# Patient Record
Sex: MALE | Race: WHITE | NOT HISPANIC OR LATINO | Employment: FULL TIME | URBAN - METROPOLITAN AREA
[De-identification: names, ages, dates, MRNs, and addresses within clinical notes are randomized per-mention and may not be internally consistent; named-entity substitution may affect disease eponyms.]

---

## 2017-01-05 RX ORDER — CABERGOLINE 0.5 MG/1
TABLET ORAL
Qty: 50 TAB | Refills: 3 | Status: SHIPPED | OUTPATIENT
Start: 2017-01-05 | End: 2018-03-12 | Stop reason: SDUPTHER

## 2017-02-27 DIAGNOSIS — E23.0 HYPOPITUITARISM (HCC): ICD-10-CM

## 2017-02-27 RX ORDER — TESTOSTERONE CYPIONATE 200 MG/ML
100 INJECTION, SOLUTION INTRAMUSCULAR
Qty: 3 ML | Refills: 0 | Status: SHIPPED
Start: 2017-02-27 | End: 2017-06-02 | Stop reason: SDUPTHER

## 2017-04-24 ENCOUNTER — OFFICE VISIT (OUTPATIENT)
Dept: ENDOCRINOLOGY | Facility: MEDICAL CENTER | Age: 57
End: 2017-04-24
Payer: COMMERCIAL

## 2017-04-24 DIAGNOSIS — E23.0 HYPOPITUITARISM (HCC): ICD-10-CM

## 2017-04-24 DIAGNOSIS — D35.2 PITUITARY MACROADENOMA (HCC): Primary | ICD-10-CM

## 2017-04-24 DIAGNOSIS — E22.1 HYPERPROLACTINEMIA (HCC): ICD-10-CM

## 2017-04-24 PROCEDURE — 99214 OFFICE O/P EST MOD 30 MIN: CPT | Performed by: INTERNAL MEDICINE

## 2017-04-24 NOTE — MR AVS SNAPSHOT
Nnamdi Millan   2017 2:40 PM   Office Visit   MRN: [id number]    Department:  Endocrinology [facility]   Dept Phone:  [phone]    Description:  Male : 1960   Provider:  Dario Caballero M.D.           Allergies as of 2017     No Known Allergies      You were diagnosed with     Pituitary macroadenoma (CMS-HCC)   [600844]  -  Primary     Hyperprolactinemia (CMS-[facility])   [462942]       Hypopituitarism (CMS-[facility])   [277963]         Basic Information     Date Of Birth Sex Race Ethnicity Preferred Language    1960 Male White Non- English      Your appointments     Oct 23, 2017 10:00 AM   Established Patient with Dario Caballero M.D.   [facility]    [id number] [place] [address], [address]  [facility] [address] [phone]           You will be receiving a confirmation call a few days before your appointment from our automated call confirmation system.              Problem List              ICD-10-CM Priority Class Noted - Resolved    Pituitary macroadenoma (CMS-[facility]) D35.2   2009 - Present    Hypopituitarism (CMS-[facility]) E23.0   2009 - Present    Rectal lump K62.9   2009 - Present    Hyperprolactinemia (CMS-[facility]) E22.1   Unknown - Present    GERD with esophagitis K21.0   2016 - Present      Health Maintenance        Date Due Completion Dates    IMM DTaP/Tdap/Td Vaccine (1 - Tdap) 1979 ---    COLONOSCOPY 2010 ---            Current Immunizations     No immunizations on file.      Below and/or attached are the medications your provider expects you to take. Review all of your home medications and newly ordered medications with your provider and/or pharmacist. Follow medication instructions as directed by your provider and/or pharmacist. Please keep your medication list with you and share with your provider. Update the information when medications are discontinued, doses are changed, or new medications (including  "over-the-counter products) are added; and carry medication information at all times in the event of emergency situations     Allergies:  No Known Allergies          Medications  Valid as of: April 24, 2017 -  3:21 PM    Generic Name Brand Name Tablet Size Instructions for use    Cabergoline (Tab) DOSTINEX 0.5 MG Take 0.25 mg by mouth every Monday and Friday. Indications: Abnormally Increased Levels of Prolactin in the Blood        Cabergoline (Tab) DOSTINEX 0.5 MG TAKE 1 AND 1/2 TABLETS 2   TIMES A WEEK FOR ABNORMALLYINCREASED LEVELS OF        PROLACTIN IN THE BLOOD        Famotidine (Tab) PEPCID 20 MG take 1 tablet by mouth twice a day        Famotidine (Tab) PEPCID 20 MG Take 1 Tab by mouth 2 times a day.        Hydrocortisone (Tab) CORTEF 10 MG TAKE 2 TABLETS (20MG) EVERYMORNING AND TAKE 1 TABLET  (10MG) EVERY EVENING        Levothyroxine Sodium (Tab) SYNTHROID 100 MCG TAKE 1 TABLET BY MOUTH ALTERNATING WITH 1 AND 1/2 TABLETS EVERY OTHER DAY        Needle (Disp) (Misc) NEEDLE (DISP) 23 G 23G X 1\" Use to administer testosterone every 14 days        Needle (Disp) (Misc) NEEDLE (DISP) 18 G 18G X 1\" Use to draw testosterone        Syringe (Disposable) (Misc) Syringe 3 ML Use on every 14 days        Testosterone Cypionate (Solution) DEPO-TESTOSTERONE 200 MG/ML 0.5 mL by Intramuscular route every 14 days.        Testosterone Cypionate (Solution) DEPO-TESTOSTERONE 200 MG/ML 0.5 mL by Intramuscular route every 14 days.        .                 Medicines prescribed today were sent to:     [facility] - [place], [place] - [address] AT PORTAL TO REGISTERED [facility]    [address] [facility] [address]    Phone: [phone] Fax: [phone]    Open 24 Hours?: No    [facility] [place] [address] - [address] [place] [address]    Phone: [phone] Fax: [phone]    Open 24 Hours?: No      Medication refill instructions:       If your " prescription bottle indicates you have medication refills left, it is not necessary to call your provider’s office. Please contact your pharmacy and they will refill your medication.    If your prescription bottle indicates you do not have any refills left, you may request refills at any time through one of the following ways: The online nooked system (except Urgent Care), by calling your provider’s office, or by asking your pharmacy to contact your provider’s office with a refill request. Medication refills are processed only during regular business hours and may not be available until the next business day. Your provider may request additional information or to have a follow-up visit with you prior to refilling your medication.   *Please Note: Medication refills are assigned a new Rx number when refilled electronically. Your pharmacy may indicate that no refills were authorized even though a new prescription for the same medication is available at the pharmacy. Please request the medicine by name with the pharmacy before contacting your provider for a refill.        Your To Do List     Future Labs/Procedures Complete By Expires    CBC WITH DIFFERENTIAL  As directed 10/25/2017    COMP METABOLIC PANEL  As directed 10/25/2017    IGF-1 SOMATOMEDIN  As directed 4/25/2018    MR-BRAIN PITUITARY W/WO  As directed 4/24/2018    PROLACTIN  As directed 10/25/2017    TESTOSTERONE,FREE ADULT MALE  As directed 4/24/2018      Other Notes About Your Plan     [facility] [phone]           nooked Access Code: [email or web address]  Expires: 5/24/2017  3:20 PM    nooked  A secure, online tool to manage your health information     [employer]’s nooked® is a secure, online tool that connects you to your personalized health information from the privacy of your home -- day or night - making it very easy for you to manage your healthcare. Once the activation process is completed, you can even access your medical  information using the [employer] alis, which is available for free in the Apple Alis store or Google Play store.     [employer] provides the following levels of access (as shown below):   My Chart Features   [facility] Doctor [facility]  Specialists [facility]  Urgent  Care Non-[facility]  Primary Care  Doctor   Email your healthcare team securely and privately 24/7 X X X    Manage appointments: schedule your next appointment; view details of past/upcoming appointments X      Request prescription refills. X      View recent personal medical records, including lab and immunizations X X X X   View health record, including health history, allergies, medications X X X X   Read reports about your outpatient visits, procedures, consult and ER notes X X X X   See your discharge summary, which is a recap of your hospital and/or ER visit that includes your diagnosis, lab results, and care plan. X X       How to register for [employer]:  1. Go to  [email or web address].  2. Click on the Sign Up Now box, which takes you to the New Member Sign Up page. You will need to provide the following information:  a. Enter your [employer] Access Code exactly as it appears at the top of this page. (You will not need to use this code after you’ve completed the sign-up process. If you do not sign up before the expiration date, you must request a new code.)   b. Enter your date of birth.   c. Enter your home email address.   d. Click Submit, and follow the next screen’s instructions.  3. Create a [employer] ID. This will be your [employer] login ID and cannot be changed, so think of one that is secure and easy to remember.  4. Create a [employer] password. You can change your password at any time.  5. Enter your Password Reset Question and Answer. This can be used at a later time if you forget your password.   6. Enter your e-mail address. This allows you to receive e-mail notifications when new information is available in [employer].  7. Click Sign Up. You can now  view your health information.    For assistance activating your [employer] account, call [phone]

## 2017-04-25 NOTE — PROGRESS NOTES
Chief Complaint   Patient presents with   • Pituitary Adenoma     hyperprolactinemia        HPI:      1. Hyperprolactinemia.     The patient has decided not to try cyber knife or any additional surgery for his pituitary adenoma.  He has been intolerant of cabergoline but he is going to try to tolerate it rather than take the risk of cyber knife therapy.  He is concerned about secondary malignancy, whether that is valid or not, I don’t know but that is his concern.  He is already hypopit so that shouldn’t be a worry about cyber knife.  In any event, he is taking cabergoline .75 mg twice a week.  He gets some headache and some nausea but it does go away and he is able to carry on with normal function.  His prolactin in January was 116 and now it is down to 59 so I believe he is controlling at least that aspect.  Whether or not he is controlling tumor growth, we don’t know and he knows that we want to do an MRI fairly soon.      2. Pituitary adenoma.    As per the above taking cabergoline regularly.  Vision is intact, particularly peripheral vision.  Headaches, he is making them tolerable.  We will do an MRI when he can get to it.  I will put the order in.    3. Hypopituitarism.    He is satisfied with intramuscular testosterone 100 mg every two weeks.  I did not do a blood level this time but I did do a PSA which is low at 0.8 and his prostate exam last October was benign.      I will see him again in six months and we will update more complete lab at that time.      ROS:  He has been developing small inflammatory nodules here and there. There is one over each anterior tibial area which might pass for erythema nodosum, but he gets them elsewhere on the body, so I don't think so. I advised a dermatologist.    All other systems reported as negative or unchanged since last exam      Allergies: No Known Allergies    Current medicines including changes today:  Current Outpatient Prescriptions   Medication Sig Dispense  "Refill   • testosterone cypionate (DEPO-TESTOSTERONE) 200 MG/ML Solution injection 0.5 mL by Intramuscular route every 14 days. 3 mL 0   • cabergoline (DOSTINEX) 0.5 MG tablet TAKE 1 AND 1/2 TABLETS 2   TIMES A WEEK FOR ABNORMALLYINCREASED LEVELS OF        PROLACTIN IN THE BLOOD 50 Tab 3   • famotidine (PEPCID) 20 MG Tab Take 1 Tab by mouth 2 times a day. 180 Tab 3   • levothyroxine (SYNTHROID) 100 MCG Tab TAKE 1 TABLET BY MOUTH ALTERNATING WITH 1 AND 1/2 TABLETS EVERY OTHER  Tab 3   • testosterone cypionate (DEPO-TESTOSTERONE) 200 MG/ML Solution injection 0.5 mL by Intramuscular route every 14 days. 1 mL 0   • famotidine (PEPCID) 20 MG Tab take 1 tablet by mouth twice a day 180 Tab 1   • hydrocortisone (CORTEF) 10 MG Tab TAKE 2 TABLETS (20MG) EVERYMORNING AND TAKE 1 TABLET  (10MG) EVERY EVENING 270 Tab 3   • NEEDLE, DISP, 23 G 23G X 1\" Misc Use to administer testosterone every 14 days 10 Each 6   • NEEDLE, DISP, 18 G 18G X 1\" Misc Use to draw testosterone 10 Each 6   • Syringe 3 ML Misc Use on every 14 days 10 Each 6   • cabergoline (DOSTINEX) 0.5 MG tablet Take 0.25 mg by mouth every Monday and Friday. Indications: Abnormally Increased Levels of Prolactin in the Blood       No current facility-administered medications for this visit.        Past Medical History   Diagnosis Date   • Pituitary adenoma (CMS-[facility]) 2005     transsphenoidal hypophysectomy   • Hyperprolactinemia (CMS-[facility])    • Hypopituitarism (CMS-[facility])    • rectal nodule      mucosal cyst   • GERD (gastroesophageal reflux disease)        PHYSICAL EXAM:    Weight 259 pounds   , height 6 feet 1 inch.  Blood pressure 124/72    , pulse is 60   . O2 saturation 93%.    Gen.   appears healthy in no distress    Skin   appropriate for sex and age    HEENT  visual fields full to gross confrontation    Neck   no adenopathy, no palpable thyroid gland    Heart  regular.    Breasts   normal male. No gynecomastia    Extremities  no edema    Neuro  gait and " station normal    Psych  appropriate      ASSESSMENT AND RECOMMENDATIONS    1. Hyperprolactinemia (CMS-HCC)              Improving with cabergoline 0.75 mg twice per week  - PROLACTIN; Future  - MR-BRAIN PITUITARY W/WO; Future    2. Pituitary macroadenoma (CMS-HCC)    - TESTOSTERONE,FREE ADULT MALE; Future  - PROLACTIN; Future  - IGF-1 SOMATOMEDIN; Future  - MR-BRAIN PITUITARY W/WO; Future    3. Hypopituitarism (CMS-HCC)    - CBC WITH DIFFERENTIAL; Future  - COMP METABOLIC PANEL; Future      DISPOSITION: Return in about 6 months (around 10/24/2017).       Dario Caballero M.D.    Copies to: Pcp Pt States None None

## 2017-05-11 DIAGNOSIS — E23.0 HYPOPITUITARISM (HCC): ICD-10-CM

## 2017-05-11 RX ORDER — TESTOSTERONE CYPIONATE 200 MG/ML
100 INJECTION, SOLUTION INTRAMUSCULAR
Qty: 1 ML | Refills: 0 | Status: SHIPPED
Start: 2017-05-11 | End: 2017-08-30 | Stop reason: SDUPTHER

## 2017-06-02 DIAGNOSIS — D35.2 PITUITARY MACROADENOMA (HCC): ICD-10-CM

## 2017-06-02 DIAGNOSIS — E23.0 HYPOPITUITARISM (HCC): ICD-10-CM

## 2017-06-02 RX ORDER — TESTOSTERONE CYPIONATE 200 MG/ML
100 INJECTION, SOLUTION INTRAMUSCULAR
Qty: 3 ML | Refills: 0 | Status: SHIPPED
Start: 2017-06-02 | End: 2017-06-05

## 2017-06-02 RX ORDER — HYDROCORTISONE 10 MG/1
TABLET ORAL
Qty: 270 TAB | Refills: 0 | Status: SHIPPED | OUTPATIENT
Start: 2017-06-02 | End: 2017-08-30 | Stop reason: SDUPTHER

## 2017-06-06 DIAGNOSIS — E23.0 HYPOPITUITARISM (HCC): ICD-10-CM

## 2017-08-30 DIAGNOSIS — D35.2 PITUITARY MACROADENOMA (HCC): ICD-10-CM

## 2017-08-30 DIAGNOSIS — K31.89 HYPERACIDITY: ICD-10-CM

## 2017-08-30 DIAGNOSIS — E23.0 HYPOPITUITARISM (HCC): ICD-10-CM

## 2017-09-04 RX ORDER — FAMOTIDINE 20 MG/1
20 TABLET, FILM COATED ORAL 2 TIMES DAILY
Qty: 180 TAB | Refills: 1 | Status: SHIPPED | OUTPATIENT
Start: 2017-09-04 | End: 2018-06-11 | Stop reason: SDUPTHER

## 2017-09-04 RX ORDER — TESTOSTERONE CYPIONATE 200 MG/ML
100 INJECTION, SOLUTION INTRAMUSCULAR
Qty: 1 ML | Refills: 2 | Status: SHIPPED
Start: 2017-09-04 | End: 2017-12-04 | Stop reason: SDUPTHER

## 2017-09-04 RX ORDER — HYDROCORTISONE 10 MG/1
TABLET ORAL
Qty: 270 TAB | Refills: 0 | Status: SHIPPED | OUTPATIENT
Start: 2017-09-04 | End: 2017-12-04 | Stop reason: SDUPTHER

## 2017-10-23 ENCOUNTER — OFFICE VISIT (OUTPATIENT)
Dept: ENDOCRINOLOGY | Facility: MEDICAL CENTER | Age: 57
End: 2017-10-23
Payer: COMMERCIAL

## 2017-10-23 VITALS
HEIGHT: 73 IN | BODY MASS INDEX: 33.66 KG/M2 | HEART RATE: 62 BPM | DIASTOLIC BLOOD PRESSURE: 84 MMHG | OXYGEN SATURATION: 96 % | SYSTOLIC BLOOD PRESSURE: 138 MMHG | WEIGHT: 254 LBS

## 2017-10-23 DIAGNOSIS — E23.0 HYPOPITUITARISM (HCC): Primary | ICD-10-CM

## 2017-10-23 DIAGNOSIS — E22.1 HYPERPROLACTINEMIA (HCC): ICD-10-CM

## 2017-10-23 DIAGNOSIS — Z12.5 SPECIAL SCREENING FOR MALIGNANT NEOPLASM OF PROSTATE: ICD-10-CM

## 2017-10-23 DIAGNOSIS — D35.2 PITUITARY MACROADENOMA (HCC): ICD-10-CM

## 2017-10-23 PROCEDURE — 99214 OFFICE O/P EST MOD 30 MIN: CPT | Performed by: INTERNAL MEDICINE

## 2017-10-24 NOTE — PROGRESS NOTES
"Chief Complaint   Patient presents with   • Pituitary Adenoma     hyperprolactinemia   • Hypopituitarism        HPI:        1. Hyperprolactinemia.    He is tolerating cabergoline .75 mg twice weekly.  It does make him sick for a day or so but he is willing to do that rather than cyberknife on his pituitary.  He still has headaches but not worse or incapacitating.  No breast swelling or tenderness.  His most recent prolactin done last month was 79 and previous 59 and prior to that 116 so he is still in reasonable range taking all the cabergoline that he can tolerate.    2. Pituitary adenoma.    His last MRI was 2015.  He can’t seem to get away from work on any regular basis enough to schedule an MRI.  Fortunately his visual fields remain intact.  We are assuming his pituitary adenoma is not growing but he understands we will need to do another MRI sometime when he can arrange it.    3. Hypopituitarism.    He is satisfied with his pituitary replacement.  He is taking testosterone 100 mg every two weeks.  His recent testosterone level which I think was done before the next shot was low at 0.6 (7.2-24).  Nonetheless, he is satisfied with his current level of function.  Hydrocortisone 20 mg AM and 10 mg PM and taking levothyroxine 100 mcg per day.  I did not get a T4 level this time.  Also I did not get a PSA level which we will do next time around.  I did check his prostate today and it is benign.      ROS:  All other systems reported as negative or unchanged since last exam      Allergies: No Known Allergies    Current medicines including changes today:  Current Outpatient Prescriptions   Medication Sig Dispense Refill   • hydrocortisone (CORTEF) 10 MG Tab TAKE 2 TABLETS BY MOUTH IN THE MORNING AND 1 TABLET IN THE EVENING. 270 Tab 0   • testosterone cypionate (DEPO-TESTOSTERONE) 200 MG/ML Solution injection 0.5 mL by Intramuscular route every 14 days. 1 mL 2   • NEEDLE, DISP, 23 G 23G X 1\" Misc Use to administer " "testosterone every 14 days 10 Each 6   • Syringe 3 ML Misc Use on every 14 days 10 Each 6   • cabergoline (DOSTINEX) 0.5 MG tablet TAKE 1 AND 1/2 TABLETS 2   TIMES A WEEK FOR ABNORMALLYINCREASED LEVELS OF        PROLACTIN IN THE BLOOD 50 Tab 3   • famotidine (PEPCID) 20 MG Tab Take 1 Tab by mouth 2 times a day. 180 Tab 3   • levothyroxine (SYNTHROID) 100 MCG Tab TAKE 1 TABLET BY MOUTH ALTERNATING WITH 1 AND 1/2 TABLETS EVERY OTHER  Tab 3   • famotidine (PEPCID) 20 MG Tab Take 1 Tab by mouth 2 times a day. TAKE 1 TABLET BY MOUTH TWICE A  Tab 1   • NEEDLE, DISP, 18 G 18G X 1\" Misc Use to draw testosterone 10 Each 6   • cabergoline (DOSTINEX) 0.5 MG tablet Take 0.25 mg by mouth every Monday and Friday. Indications: Abnormally Increased Levels of Prolactin in the Blood       No current facility-administered medications for this visit.         Past Medical History:   Diagnosis Date   • GERD (gastroesophageal reflux disease)    • Hyperprolactinemia (CMS-HCC)    • Hypopituitarism (CMS-HCC)    • Pituitary adenoma (CMS-HCC) 2005    transsphenoidal hypophysectomy   • rectal nodule     mucosal cyst       PHYSICAL EXAM:    /84   Pulse 62   Ht 1.854 m (6' 1\")   Wt 115.2 kg (254 lb)   SpO2 96%   BMI 33.51 kg/m²      Gen.   appears healthy     Skin   appropriate for sex and age    HEENT  visual fields are full to gross confrontation    Neck   No abnormalities in the thyroid area. No nodules or masses elsewhere in the neck or supraclavicular area.    Heart  regular    Breasts   normal male, no gynecomastia    Abdomen   soft nontender without masses    Rectal exam   prostate is mildly enlarged about 1+. Well-circumscribed and smooth. No induration or nodules. Benign    Extremities  no edema    Neuro  gait and station normal    Psych  appropriate    ASSESSMENT AND RECOMMENDATIONS    1. Hyperprolactinemia (CMS-HCC)               Limited tolerance for cabergoline.  - PROLACTIN; Future    2. Pituitary " macroadenoma (CMS-HCC)            Question of residual. Last MRI 2015  - COMP METABOLIC PANEL; Future  - CBC WITHOUT DIFFERENTIAL; Future  - TESTOSTERONE,FREE ADULT MALE; Future  - ESTRADIOL; Future  - FREE THYROXINE; Future    3. Hypopituitarism (CMS-HCC)              - COMP METABOLIC PANEL; Future  - CBC WITHOUT DIFFERENTIAL; Future  - TESTOSTERONE,FREE ADULT MALE; Future  - ESTRADIOL; Future  - FREE THYROXINE; Future    4. Special screening for malignant neoplasm of prostate    - PROSTATE SPECIFIC AG SCREENING; Future      DISPOSITION:  Follow up 6 months       Dario Caballero M.D.    Copies to: Pcp Pt States None

## 2017-12-04 DIAGNOSIS — E23.0 HYPOPITUITARISM (HCC): ICD-10-CM

## 2017-12-04 DIAGNOSIS — D35.2 PITUITARY MACROADENOMA (HCC): ICD-10-CM

## 2017-12-05 RX ORDER — LEVOTHYROXINE SODIUM 0.1 MG/1
TABLET ORAL
Qty: 180 TAB | Refills: 3 | Status: SHIPPED | OUTPATIENT
Start: 2017-12-05 | End: 2018-11-16 | Stop reason: SDUPTHER

## 2017-12-05 RX ORDER — HYDROCORTISONE 10 MG/1
TABLET ORAL
Qty: 270 TAB | Refills: 1 | Status: SHIPPED | OUTPATIENT
Start: 2017-12-05 | End: 2018-06-11 | Stop reason: SDUPTHER

## 2018-01-10 ENCOUNTER — TELEPHONE (OUTPATIENT)
Dept: ENDOCRINOLOGY | Facility: MEDICAL CENTER | Age: 58
End: 2018-01-10

## 2018-01-10 NOTE — TELEPHONE ENCOUNTER
"Phone call with Anupama Solitario regarding the PSA lab that was conducted for Nnamdi Millan. They need clarification in order for him to not be billed. As of right now, it was entered as screening for malignant neoplasm. Current guidelines do not allow \"screening\" as a viable option for testing the PSA. They are asking for a letter of medical necessity as to why the PSA was done, in order for them to get that lab covered for Nnamdi.  "

## 2018-01-16 ENCOUNTER — TELEPHONE (OUTPATIENT)
Dept: ENDOCRINOLOGY | Facility: MEDICAL CENTER | Age: 58
End: 2018-01-16

## 2018-01-16 NOTE — TELEPHONE ENCOUNTER
"Phone conversation with Anupama over the necessity of the PSA lab. She says that as it stands right now, the PSA lab will not be covered because it is considered a \"screening for cancer\". She is requesting a letter of medical necessity as to why PSA needs to be monitored in Nnamdi Millan's case.     Telephone conversation with Anupama Solitario. Patient is getting testosterone and has prostate enlargement. The PSA is a diagnostic test to rule out prostate cancer which of course would be contraindicated in terms of using testosterone. She will get it covered.    Dario Caballero M.D.  "

## 2018-03-12 RX ORDER — CABERGOLINE 0.5 MG/1
TABLET ORAL
Qty: 50 TAB | Refills: 3 | Status: SHIPPED | OUTPATIENT
Start: 2018-03-12 | End: 2018-06-13 | Stop reason: SDUPTHER

## 2018-04-23 ENCOUNTER — APPOINTMENT (OUTPATIENT)
Dept: ENDOCRINOLOGY | Facility: MEDICAL CENTER | Age: 58
End: 2018-04-23
Payer: COMMERCIAL

## 2018-06-11 DIAGNOSIS — K31.89 HYPERACIDITY: ICD-10-CM

## 2018-06-11 DIAGNOSIS — D35.2 PITUITARY MACROADENOMA (HCC): ICD-10-CM

## 2018-06-12 RX ORDER — HYDROCORTISONE 10 MG/1
TABLET ORAL
Qty: 270 TAB | Refills: 1 | Status: SHIPPED | OUTPATIENT
Start: 2018-06-12 | End: 2018-08-29 | Stop reason: SDUPTHER

## 2018-06-12 RX ORDER — FAMOTIDINE 20 MG/1
TABLET, FILM COATED ORAL
Qty: 180 TAB | Refills: 1 | Status: SHIPPED | OUTPATIENT
Start: 2018-06-12 | End: 2023-02-22 | Stop reason: SDUPTHER

## 2018-06-13 DIAGNOSIS — E23.0 HYPOPITUITARISM (HCC): ICD-10-CM

## 2018-06-13 RX ORDER — CABERGOLINE 0.5 MG/1
TABLET ORAL
Qty: 50 TAB | Refills: 3 | Status: SHIPPED | OUTPATIENT
Start: 2018-06-13 | End: 2019-02-20 | Stop reason: SDUPTHER

## 2018-06-13 RX ORDER — TESTOSTERONE CYPIONATE 200 MG/ML
INJECTION, SOLUTION INTRAMUSCULAR
Qty: 3 ML | Refills: 2 | Status: SHIPPED | OUTPATIENT
Start: 2018-06-13 | End: 2018-11-28 | Stop reason: SDUPTHER

## 2018-06-25 ENCOUNTER — OFFICE VISIT (OUTPATIENT)
Dept: ENDOCRINOLOGY | Facility: MEDICAL CENTER | Age: 58
End: 2018-06-25
Payer: COMMERCIAL

## 2018-06-25 ENCOUNTER — HOSPITAL ENCOUNTER (OUTPATIENT)
Dept: LAB | Facility: MEDICAL CENTER | Age: 58
End: 2018-06-25
Attending: INTERNAL MEDICINE
Payer: COMMERCIAL

## 2018-06-25 VITALS
DIASTOLIC BLOOD PRESSURE: 90 MMHG | BODY MASS INDEX: 33.8 KG/M2 | OXYGEN SATURATION: 95 % | WEIGHT: 255 LBS | HEART RATE: 67 BPM | SYSTOLIC BLOOD PRESSURE: 148 MMHG | HEIGHT: 73 IN

## 2018-06-25 DIAGNOSIS — E22.1 HYPERPROLACTINEMIA (HCC): Primary | ICD-10-CM

## 2018-06-25 DIAGNOSIS — D35.2 PITUITARY MACROADENOMA (HCC): ICD-10-CM

## 2018-06-25 DIAGNOSIS — E23.0 HYPOPITUITARISM (HCC): ICD-10-CM

## 2018-06-25 DIAGNOSIS — E22.1 HYPERPROLACTINEMIA (HCC): ICD-10-CM

## 2018-06-25 LAB
ANION GAP SERPL CALC-SCNC: 8 MMOL/L (ref 0–11.9)
BUN SERPL-MCNC: 13 MG/DL (ref 8–22)
CALCIUM SERPL-MCNC: 9.5 MG/DL (ref 8.5–10.5)
CHLORIDE SERPL-SCNC: 109 MMOL/L (ref 96–112)
CO2 SERPL-SCNC: 23 MMOL/L (ref 20–33)
CREAT SERPL-MCNC: 0.87 MG/DL (ref 0.5–1.4)
ESTRADIOL SERPL-MCNC: <20 PG/ML
GLUCOSE SERPL-MCNC: 103 MG/DL (ref 65–99)
POTASSIUM SERPL-SCNC: 4.1 MMOL/L (ref 3.6–5.5)
PROLACTIN SERPL-MCNC: 57.97 NG/ML (ref 2.1–17.7)
SODIUM SERPL-SCNC: 140 MMOL/L (ref 135–145)
T4 FREE SERPL-MCNC: 0.82 NG/DL (ref 0.53–1.43)
TSH SERPL DL<=0.005 MIU/L-ACNC: 0.27 UIU/ML (ref 0.38–5.33)

## 2018-06-25 PROCEDURE — 84402 ASSAY OF FREE TESTOSTERONE: CPT

## 2018-06-25 PROCEDURE — 99214 OFFICE O/P EST MOD 30 MIN: CPT | Performed by: INTERNAL MEDICINE

## 2018-06-25 PROCEDURE — 84439 ASSAY OF FREE THYROXINE: CPT

## 2018-06-25 PROCEDURE — 84146 ASSAY OF PROLACTIN: CPT

## 2018-06-25 PROCEDURE — 84305 ASSAY OF SOMATOMEDIN: CPT

## 2018-06-25 PROCEDURE — 84443 ASSAY THYROID STIM HORMONE: CPT

## 2018-06-25 PROCEDURE — 80048 BASIC METABOLIC PNL TOTAL CA: CPT

## 2018-06-25 PROCEDURE — 36415 COLL VENOUS BLD VENIPUNCTURE: CPT

## 2018-06-25 PROCEDURE — 82670 ASSAY OF TOTAL ESTRADIOL: CPT

## 2018-06-26 LAB
IGF-I SERPL-MCNC: 162 NG/ML (ref 58–204)
IGF-I Z-SCORE SERPL: 1
TESTOST FREE SERPL-MCNC: 32 PG/ML (ref 47–244)

## 2018-06-26 NOTE — PROGRESS NOTES
"Chief Complaint   Patient presents with   • Pituitary Adenoma     hyperprolactinemia   • Hypopituitarism        HPI:    SEE assessment and recommendations below    ROS:  There have been some serious billing problems with the hospital in [facility] so he has been reluctant to have tests done for fear of getting into a another problem    Also has difficulty scheduling time off work to get tests done or to come into renal.    All other systems reported as negative or unchanged since last exam      Allergies: No Known Allergies    Current medicines including changes today:  Current Outpatient Prescriptions   Medication Sig Dispense Refill   • testosterone cypionate (DEPO-TESTOSTERONE) 200 MG/ML Solution injection INJECT 0.5 ML BY INTRAMUSCULAR ROUTE EVERY 14 DAYS. 3 mL 2   • cabergoline (DOSTINEX) 0.5 MG tablet TAKE 1 AND 1/2 TABLETS, 2 TIMES A WEEK, FOR ABNORMALLY INCREASED LEVELS OF PROLACTIN IN THE BLOOD 50 Tab 3   • hydrocortisone (CORTEF) 10 MG Tab TAKE 2 TABLETS BY MOUTH IN THE MORNING AND 1 TABLET IN THE EVENING. 270 Tab 1   • levothyroxine (SYNTHROID) 100 MCG Tab TAKE 1 TABLET BY MOUTH ALTERNATING WITH 1 AND 1/2 TABLETS EVERY OTHER  Tab 3   • NEEDLE, DISP, 18 G 18G X 1\" Misc Use to draw testosterone 10 Each 6   • NEEDLE, DISP, 23 G 23G X 1\" Misc Use to administer testosterone every 14 days 10 Each 6   • Syringe 3 ML Misc Use on every 14 days 10 Each 6   • famotidine (PEPCID) 20 MG Tab Take 1 Tab by mouth 2 times a day. 180 Tab 3   • famotidine (PEPCID) 20 MG Tab Take 1 tablet by mouth twice daily. 180 Tab 1   • cabergoline (DOSTINEX) 0.5 MG tablet Take 0.25 mg by mouth every Monday and Friday. Indications: Abnormally Increased Levels of Prolactin in the Blood       No current facility-administered medications for this visit.         Past Medical History:   Diagnosis Date   • GERD (gastroesophageal reflux disease)    • Hyperprolactinemia (HCC)    • Hypopituitarism (HCC)    • Pituitary adenoma (HCC) 2005    " "transsphenoidal hypophysectomy   • rectal nodule     mucosal cyst       PHYSICAL EXAM:    /90   Pulse 67   Ht 1.854 m (6' 1\")   Wt 115.7 kg (255 lb)   SpO2 95%   BMI 33.64 kg/m²      Gen.   appears healthy in no distress    Skin   appropriate for sex and age    HEENT    visual fields are full to gross confrontation    Neck   no palpable thyroid    Breasts   normal male fatty tissue. No gynecomastia    Heart  regular    Extremities  no edema    Neuro  gait and station normal    Psych  appropriate     Prostate exam done October 2017. PSA done around that time that I do not have the result    ASSESSMENT AND RECOMMENDATIONS    1. Pituitary macroadenoma (HCC), 2005               Prolactinoma partially resected transsphenoidal surgery.               Poor tolerance for cabergoline but able to take 0.75 mg twice weekly now               Last lab September 2017 prolactin = 79.5               Continues to have intermittent chronic headache, nonprogressive. Question relationship to adenoma               Last pituitary MRI 2015 showed small solid/cystic tissue within the sella unchanged compared to 2013    - IGF-1 SOMATOMEDIN; Future  - TESTOSTERONE,FREE ADULT MALE; Future  - ESTRADIOL; Future  - FREE THYROXINE; Future  - TSH; Future  - BASIC METABOLIC PANEL; Future  - CBC WITHOUT DIFFERENTIAL    2. Hyperprolactinemia (HCC)               As above. Can only tolerate cabergoline 0.75 mg twice weekly               Prolactin done last September was 79. Prior to that it was 59 and prior to that it was 116  - PROLACTIN; Future    3. Hypopituitarism (HCC)            Adequately replaced with                    Intramuscular testosterone 100 mg every 2 weeks                    Hydrocortisone 20 mg a.m. and 10 mg p.m. He knows to increase dose with significant stress or illness                    Levothyroxine 100 µg daily             Update lab as per the above      DISPOSITION:  Return in 6 months       Dario Caballero, " M.D.    Copies to: Pcp Pt States None None

## 2018-07-08 ENCOUNTER — TELEPHONE (OUTPATIENT)
Dept: ENDOCRINOLOGY | Facility: MEDICAL CENTER | Age: 58
End: 2018-07-08

## 2018-07-09 NOTE — TELEPHONE ENCOUNTER
Telephone conversation with patient    Current lab results reviewed and mostly satisfactory. Free testosterone is low at 32 taken about midpoint between 2 shots. He is satisfied with this testosterone replacement so we will not make an adjustment. His estradiol level was less than 20. Thyroid levels are in good range with free T4 0.8 and IGF 1 also normal at 162    Prolactin is stable at 57.9. Previously it was 79 and prior to that it was 59.    I indicated we called for the PSA done last fall in the lab could not find a result. He said he would pursue that.    Dario Caballero M.D.

## 2018-07-16 ENCOUNTER — TELEPHONE (OUTPATIENT)
Dept: ENDOCRINOLOGY | Facility: MEDICAL CENTER | Age: 58
End: 2018-07-16

## 2018-08-29 DIAGNOSIS — D35.2 PITUITARY MACROADENOMA (HCC): ICD-10-CM

## 2018-08-29 DIAGNOSIS — K21.00 GERD WITH ESOPHAGITIS: ICD-10-CM

## 2018-08-29 RX ORDER — HYDROCORTISONE 10 MG/1
TABLET ORAL
Qty: 270 TAB | Refills: 1 | Status: SHIPPED | OUTPATIENT
Start: 2018-08-29 | End: 2019-02-20 | Stop reason: SDUPTHER

## 2018-08-29 RX ORDER — FAMOTIDINE 20 MG/1
20 TABLET, FILM COATED ORAL 2 TIMES DAILY
Qty: 180 TAB | Refills: 3 | Status: SHIPPED | OUTPATIENT
Start: 2018-08-29 | End: 2019-10-29 | Stop reason: SDUPTHER

## 2018-11-19 RX ORDER — LEVOTHYROXINE SODIUM 0.1 MG/1
TABLET ORAL
Qty: 180 TAB | Refills: 3 | Status: SHIPPED | OUTPATIENT
Start: 2018-11-19 | End: 2020-01-07 | Stop reason: SDUPTHER

## 2018-11-28 DIAGNOSIS — E23.0 HYPOPITUITARISM (HCC): ICD-10-CM

## 2018-11-28 RX ORDER — TESTOSTERONE CYPIONATE 200 MG/ML
INJECTION, SOLUTION INTRAMUSCULAR
Qty: 3 ML | Refills: 2 | Status: SHIPPED | OUTPATIENT
Start: 2018-11-28 | End: 2019-05-30

## 2018-12-31 ENCOUNTER — APPOINTMENT (OUTPATIENT)
Dept: ENDOCRINOLOGY | Facility: MEDICAL CENTER | Age: 58
End: 2018-12-31
Payer: COMMERCIAL

## 2019-02-20 DIAGNOSIS — D35.2 PITUITARY MACROADENOMA (HCC): ICD-10-CM

## 2019-02-20 RX ORDER — HYDROCORTISONE 10 MG/1
TABLET ORAL
Qty: 270 TAB | Refills: 1 | Status: SHIPPED | OUTPATIENT
Start: 2019-02-20 | End: 2019-10-29 | Stop reason: SDUPTHER

## 2019-02-20 RX ORDER — CABERGOLINE 0.5 MG/1
TABLET ORAL
Qty: 50 TAB | Refills: 3 | Status: SHIPPED | OUTPATIENT
Start: 2019-02-20 | End: 2020-03-18 | Stop reason: SDUPTHER

## 2019-02-20 NOTE — TELEPHONE ENCOUNTER
Was the patient seen in the last year in this department? Yes  LOV 6/25/18 [place]  No future appts    Does patient have an active prescription for medications requested? Yes    Received Request Via: Pharmacy

## 2019-07-01 ENCOUNTER — TELEPHONE (OUTPATIENT)
Dept: ENDOCRINOLOGY | Facility: MEDICAL CENTER | Age: 59
End: 2019-07-01

## 2019-07-01 NOTE — TELEPHONE ENCOUNTER
1. Caller Name: Nnamdi                                         Call Back Number: [phone] (home)         Patient approves a detailed voicemail message: no    Spoke to patient. he made an appointment to come in August 5h. Would like to get labs an MRI done before apt. Please place orders.

## 2019-07-02 DIAGNOSIS — E23.0 HYPOPITUITARISM (HCC): ICD-10-CM

## 2019-07-02 DIAGNOSIS — E22.1 HYPERPROLACTINEMIA (HCC): ICD-10-CM

## 2019-07-02 DIAGNOSIS — D35.2 PITUITARY MACROADENOMA (HCC): Primary | ICD-10-CM

## 2019-07-02 NOTE — TELEPHONE ENCOUNTER
Phone Number Called: [phone] (home)       Call outcome: left message for patient to call back regarding message below    Message: Lm that order was sent for MRI and should be calling to schedule. Gave imagine phone number [phone]

## 2019-07-23 ENCOUNTER — APPOINTMENT (OUTPATIENT)
Dept: RADIOLOGY | Facility: MEDICAL CENTER | Age: 59
End: 2019-07-23
Attending: INTERNAL MEDICINE
Payer: COMMERCIAL

## 2019-07-23 DIAGNOSIS — E23.0 HYPOPITUITARISM (HCC): ICD-10-CM

## 2019-07-23 DIAGNOSIS — E22.1 HYPERPROLACTINEMIA (HCC): ICD-10-CM

## 2019-07-23 DIAGNOSIS — D35.2 PITUITARY MACROADENOMA (HCC): ICD-10-CM

## 2019-07-23 PROCEDURE — 70553 MRI BRAIN STEM W/O & W/DYE: CPT

## 2019-07-23 PROCEDURE — A9585 GADOBUTROL INJECTION: HCPCS | Performed by: INTERNAL MEDICINE

## 2019-07-23 PROCEDURE — 700117 HCHG RX CONTRAST REV CODE 255: Performed by: INTERNAL MEDICINE

## 2019-07-23 RX ORDER — GADOBUTROL 604.72 MG/ML
12 INJECTION INTRAVENOUS ONCE
Status: COMPLETED | OUTPATIENT
Start: 2019-07-23 | End: 2019-07-23

## 2019-07-23 RX ADMIN — GADOBUTROL 12 ML: 604.72 INJECTION INTRAVENOUS at 13:30

## 2019-07-24 ENCOUNTER — TELEPHONE (OUTPATIENT)
Dept: ENDOCRINOLOGY | Facility: MEDICAL CENTER | Age: 59
End: 2019-07-24

## 2019-07-24 NOTE — TELEPHONE ENCOUNTER
Phone Number Called: [phone] (home)       Call outcome: spoke to patient regarding message below    Message: Spoke to patient relaying message. Confirmed aug 5th apt

## 2019-07-24 NOTE — TELEPHONE ENCOUNTER
----- Message from Dario Caballero M.D. sent at 7/23/2019 10:02 PM PDT -----  Tell patient the pituitary MRI looks good and stable.  We will review this at the August 5 appointment.    Dario Caballero M.D.

## 2019-07-25 ENCOUNTER — TELEPHONE (OUTPATIENT)
Dept: ENDOCRINOLOGY | Facility: MEDICAL CENTER | Age: 59
End: 2019-07-25

## 2019-07-25 DIAGNOSIS — E29.1 SECONDARY MALE HYPOGONADISM: ICD-10-CM

## 2019-07-25 RX ORDER — TESTOSTERONE CYPIONATE 200 MG/ML
100 INJECTION, SOLUTION INTRAMUSCULAR
Qty: 3 ML | Refills: 2 | Status: SHIPPED | OUTPATIENT
Start: 2019-07-25 | End: 2019-11-01 | Stop reason: SDUPTHER

## 2019-07-25 NOTE — TELEPHONE ENCOUNTER
Was the patient seen in the last year in this department? Yes    Does patient have an active prescription for medications requested? No     Received Request Via: Pharmacy    Refill testosterone to [facility]

## 2019-08-05 ENCOUNTER — HOSPITAL ENCOUNTER (OUTPATIENT)
Dept: LAB | Facility: MEDICAL CENTER | Age: 59
End: 2019-08-05
Attending: INTERNAL MEDICINE
Payer: COMMERCIAL

## 2019-08-05 ENCOUNTER — OFFICE VISIT (OUTPATIENT)
Dept: ENDOCRINOLOGY | Facility: MEDICAL CENTER | Age: 59
End: 2019-08-05
Payer: COMMERCIAL

## 2019-08-05 VITALS
OXYGEN SATURATION: 98 % | HEART RATE: 57 BPM | SYSTOLIC BLOOD PRESSURE: 136 MMHG | DIASTOLIC BLOOD PRESSURE: 82 MMHG | WEIGHT: 236.2 LBS | BODY MASS INDEX: 31.3 KG/M2 | HEIGHT: 73 IN

## 2019-08-05 DIAGNOSIS — D35.2 PITUITARY MACROADENOMA (HCC): ICD-10-CM

## 2019-08-05 DIAGNOSIS — E22.1 HYPERPROLACTINEMIA (HCC): ICD-10-CM

## 2019-08-05 DIAGNOSIS — E23.0 HYPOPITUITARISM (HCC): ICD-10-CM

## 2019-08-05 DIAGNOSIS — G43.109 MIGRAINE AURA WITHOUT HEADACHE: ICD-10-CM

## 2019-08-05 LAB
ALBUMIN SERPL BCP-MCNC: 4.6 G/DL (ref 3.2–4.9)
ALBUMIN/GLOB SERPL: 1.9 G/DL
ALP SERPL-CCNC: 55 U/L (ref 30–99)
ALT SERPL-CCNC: 15 U/L (ref 2–50)
ANION GAP SERPL CALC-SCNC: 7 MMOL/L (ref 0–11.9)
AST SERPL-CCNC: 19 U/L (ref 12–45)
BASOPHILS # BLD AUTO: 0.5 % (ref 0–1.8)
BASOPHILS # BLD: 0.03 K/UL (ref 0–0.12)
BILIRUB SERPL-MCNC: 0.9 MG/DL (ref 0.1–1.5)
BUN SERPL-MCNC: 19 MG/DL (ref 8–22)
CALCIUM SERPL-MCNC: 9.3 MG/DL (ref 8.5–10.5)
CHLORIDE SERPL-SCNC: 108 MMOL/L (ref 96–112)
CO2 SERPL-SCNC: 24 MMOL/L (ref 20–33)
CREAT SERPL-MCNC: 0.87 MG/DL (ref 0.5–1.4)
EOSINOPHIL # BLD AUTO: 0.18 K/UL (ref 0–0.51)
EOSINOPHIL NFR BLD: 3 % (ref 0–6.9)
ERYTHROCYTE [DISTWIDTH] IN BLOOD BY AUTOMATED COUNT: 40.7 FL (ref 35.9–50)
ESTRADIOL SERPL-MCNC: <20 PG/ML
GLOBULIN SER CALC-MCNC: 2.4 G/DL (ref 1.9–3.5)
GLUCOSE SERPL-MCNC: 90 MG/DL (ref 65–99)
HCT VFR BLD AUTO: 43.4 % (ref 42–52)
HGB BLD-MCNC: 14.4 G/DL (ref 14–18)
IMM GRANULOCYTES # BLD AUTO: 0.02 K/UL (ref 0–0.11)
IMM GRANULOCYTES NFR BLD AUTO: 0.3 % (ref 0–0.9)
LYMPHOCYTES # BLD AUTO: 1.58 K/UL (ref 1–4.8)
LYMPHOCYTES NFR BLD: 26.6 % (ref 22–41)
MCH RBC QN AUTO: 29.5 PG (ref 27–33)
MCHC RBC AUTO-ENTMCNC: 33.2 G/DL (ref 33.7–35.3)
MCV RBC AUTO: 88.9 FL (ref 81.4–97.8)
MONOCYTES # BLD AUTO: 0.44 K/UL (ref 0–0.85)
MONOCYTES NFR BLD AUTO: 7.4 % (ref 0–13.4)
NEUTROPHILS # BLD AUTO: 3.69 K/UL (ref 1.82–7.42)
NEUTROPHILS NFR BLD: 62.2 % (ref 44–72)
NRBC # BLD AUTO: 0 K/UL
NRBC BLD-RTO: 0 /100 WBC
PLATELET # BLD AUTO: 185 K/UL (ref 164–446)
PMV BLD AUTO: 10.2 FL (ref 9–12.9)
POTASSIUM SERPL-SCNC: 4 MMOL/L (ref 3.6–5.5)
PROLACTIN SERPL-MCNC: 52.39 NG/ML (ref 2.1–17.7)
PROT SERPL-MCNC: 7 G/DL (ref 6–8.2)
RBC # BLD AUTO: 4.88 M/UL (ref 4.7–6.1)
SODIUM SERPL-SCNC: 139 MMOL/L (ref 135–145)
T4 FREE SERPL-MCNC: 0.93 NG/DL (ref 0.53–1.43)
TSH SERPL DL<=0.005 MIU/L-ACNC: 0.16 UIU/ML (ref 0.38–5.33)
WBC # BLD AUTO: 5.9 K/UL (ref 4.8–10.8)

## 2019-08-05 PROCEDURE — 84443 ASSAY THYROID STIM HORMONE: CPT

## 2019-08-05 PROCEDURE — 99214 OFFICE O/P EST MOD 30 MIN: CPT | Performed by: INTERNAL MEDICINE

## 2019-08-05 PROCEDURE — 85025 COMPLETE CBC W/AUTO DIFF WBC: CPT

## 2019-08-05 PROCEDURE — 84403 ASSAY OF TOTAL TESTOSTERONE: CPT

## 2019-08-05 PROCEDURE — 84305 ASSAY OF SOMATOMEDIN: CPT

## 2019-08-05 PROCEDURE — 82670 ASSAY OF TOTAL ESTRADIOL: CPT

## 2019-08-05 PROCEDURE — 36415 COLL VENOUS BLD VENIPUNCTURE: CPT

## 2019-08-05 PROCEDURE — 84439 ASSAY OF FREE THYROXINE: CPT

## 2019-08-05 PROCEDURE — 80053 COMPREHEN METABOLIC PANEL: CPT

## 2019-08-05 PROCEDURE — 84146 ASSAY OF PROLACTIN: CPT

## 2019-08-05 NOTE — PROGRESS NOTES
HPI:        1. Pituitary macro adenoma.  The patient has had some troublesome symptoms recently.  He is not having more headaches than usual but he has had a couple of episodes that sound like migraine aura.  Flashing lights that he describes as pixels in the periphery lasting ½ hour to 45 minutes. This happened 2-3 times in the last few days and something he has experienced before and does have a history of migraine.  However it got him thinking about his pituitary adenoma.  He thinks his peripheral vision is intact and he will get that checked.  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  Nothing to suggest this macro adenoma has recurred.  The brain otherwise appeared unremarkable including the orbits.  So that has been reassuring to him and I think has relieved a lot of his concerns.     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  He is taking cabergoline .25 mg twice a week.  His current lab is still pending.  It was just drawn this morning.    His memory issues are discussed in some detail.  He has had some worries that have distracted him.  His father  recently and he has had to resituate his mother who has dementia.  He visits her and she really doesn’t know much and that is very upsetting to him.  He had to dispose of all of her property and that was upsetting.  His wife had a once-in-a-lifetime trip to [place] and [place] and her sister ruined the trip and while she was gone her horse  and he had to dispose of that.  All of that has been very troubling to him.  He is having difficulty remembering certain things and names and with all that has gone on I don’t know that we can make too much of that right now.    From my  "standpoint, he is going to establish himself with a primary care physician in [place], Dr. Evans who he thinks is very good.  He has not had the original meeting yet so hope that goes well because he has other health screens to catch up with.  All in all he looks very good as I am seeing him today and I think as things are unfolding and getting resolved, he will get better and better.  I will follow up his lab tests by telephone when I see them in the next few days.     ROS:  All other systems reported as negative or unchanged since last exam      Allergies: No Known Allergies    Current medicines including changes today:  Current Outpatient Medications   Medication Sig Dispense Refill   • testosterone cypionate (DEPO-TESTOSTERONE) 200 MG/ML Solution injection 0.5 mL by Intramuscular route every 14 days for 84 days. 3 mL 2   • hydrocortisone (CORTEF) 10 MG Tab TAKE 2 TABLETS BY MOUTH IN THE MORNING AND 1 TABLET IN THE EVENING. 270 Tab 1   • levothyroxine (SYNTHROID) 100 MCG Tab TAKE 1 TABLET BY MOUTH ALTERNATING WITH 1 AND 1/2 TABLETS EVERY OTHER  Tab 3   • famotidine (PEPCID) 20 MG Tab Take 1 tablet by mouth twice daily. 180 Tab 1   • NEEDLE, DISP, 18 G 18G X 1\" Misc Use to draw testosterone 10 Each 6   • NEEDLE, DISP, 23 G 23G X 1\" Misc Use to administer testosterone every 14 days 10 Each 6   • Syringe 3 ML Misc Use on every 14 days 10 Each 6   • cabergoline (DOSTINEX) 0.5 MG tablet Take 0.25 mg by mouth every Monday and Friday. Indications: Abnormally Increased Levels of Prolactin in the Blood     • cabergoline (DOSTINEX) 0.5 MG tablet TAKE 1 AND 1/2 TABLETS, 2 TIMES A WEEK, FOR ABNORMALLY INCREASED LEVELS OF PROLACTIN IN THE BLOOD 50 Tab 3   • famotidine (PEPCID) 20 MG Tab Take 1 Tab by mouth 2 times a day. 180 Tab 3     No current facility-administered medications for this visit.         Past Medical History:   Diagnosis Date   • GERD (gastroesophageal reflux disease)    • Hyperprolactinemia (HCC)    • " "Hypopituitarism (HCC)    • Pituitary adenoma (HCC) 2005    transsphenoidal hypophysectomy   • rectal nodule     mucosal cyst       PHYSICAL EXAM:    /82 (BP Location: Left arm, Patient Position: Sitting, BP Cuff Size: Adult)   Pulse (!) 57   Ht 1.854 m (6' 1\")   Wt 107.1 kg (236 lb 3.2 oz)   SpO2 98%   BMI 31.16 kg/m²     Gen. a little overweight but otherwise appears healthy     Skin   appropriate for sex and age    HEENT   peripheral fields are full to gross confrontation    Neck   palpable thyroid.  I think it is atrophic    Breasts   normal male fatty tissue    Heart  regular    Extremities  no edema    Neuro  gait and station normal    Psych  appropriate    He intends on establishing with a new PCP and I am assuming that he is going to have his DEJAH prostate examination done at that time      ASSESSMENT AND RECOMMENDATIONS    1. Pituitary macroadenoma (HCC)             See HPI    2. Hyperprolactinemia (HCC)             Taking cabergoline 0.25 mg twice a week.  Prolactin level pending    3. Hypopituitarism (HCC)               Lab data pending               Replacement hormones as listed    4. Migraine aura without headache              Presumptive diagnosis based on his history      DISPOSITION: Follow-up lab test by telephone                           If stable return in 6 months      Dario Caballero M.D.    Copies to: [facility]  "

## 2019-08-06 LAB — TESTOST FREE SERPL-MCNC: 32 PG/ML (ref 47–244)

## 2019-08-07 LAB
IGF-I SERPL-MCNC: 130 NG/ML (ref 56–203)
IGF-I Z-SCORE SERPL: 0.3

## 2019-08-12 ENCOUNTER — TELEPHONE (OUTPATIENT)
Dept: ENDOCRINOLOGY | Facility: MEDICAL CENTER | Age: 59
End: 2019-08-12

## 2019-08-12 NOTE — TELEPHONE ENCOUNTER
----- Message from Dario Caballero M.D. sent at 8/8/2019  1:52 PM PDT -----  Please tell Nnamdi his blood chemistries are very good.    His prolactin is mildly elevated but stable.  Stay on the same cabergoline dose.  His low TSH might relate to his pituitary problems and a better measure of his thyroid dose is the T4 which is in good range.  The low testosterone depends on when the blood is done compared to when he had his injection.  No changes indicated.    Dario Caballero M.D.

## 2019-08-12 NOTE — TELEPHONE ENCOUNTER
Phone Number Called: [phone] (home)       Call outcome: spoke to patient regarding message below    Message: Spoke to patient about results. He is concerned because he had his injection on Aug 4th and labs done on Aug 5th. Thought the levels should have been higher than what the tests reported. Wondering if he needs to change the amount because of that information?

## 2019-08-18 ENCOUNTER — TELEPHONE (OUTPATIENT)
Dept: ENDOCRINOLOGY | Facility: MEDICAL CENTER | Age: 59
End: 2019-08-18

## 2019-08-18 NOTE — TELEPHONE ENCOUNTER
Telephone conversation with patient    Patient was curious about his low testosterone level 1 day after injection.  I explained that absorption will be variable 1 shot to the next and we do not expect the entirety of the testosterone be to be absorbed the first day in any event.  It should be absorbed slowly and dissipate slowly over the 2-week interval.  He actually is feeling fine so it is not a pressing issue with him.    Insert my name

## 2019-10-29 DIAGNOSIS — K21.00 GERD WITH ESOPHAGITIS: ICD-10-CM

## 2019-10-29 DIAGNOSIS — D35.2 PITUITARY MACROADENOMA (HCC): ICD-10-CM

## 2019-10-29 RX ORDER — HYDROCORTISONE 10 MG/1
TABLET ORAL
Qty: 270 TAB | Refills: 1 | Status: SHIPPED | OUTPATIENT
Start: 2019-10-29 | End: 2020-11-30 | Stop reason: SDUPTHER

## 2019-10-29 RX ORDER — FAMOTIDINE 20 MG/1
20 TABLET, FILM COATED ORAL 2 TIMES DAILY
Qty: 180 TAB | Refills: 3 | Status: SHIPPED | OUTPATIENT
Start: 2019-10-29 | End: 2020-11-30 | Stop reason: SDUPTHER

## 2019-10-29 NOTE — TELEPHONE ENCOUNTER
Was the patient seen in the last year in this department? Yes    Does patient have an active prescription for medications requested? No     Received Request Via: Pharmacy     Hydrocortisone and famotidine

## 2019-11-01 DIAGNOSIS — E29.1 SECONDARY MALE HYPOGONADISM: ICD-10-CM

## 2019-11-01 RX ORDER — TESTOSTERONE CYPIONATE 200 MG/ML
100 INJECTION, SOLUTION INTRAMUSCULAR
Qty: 3 ML | Refills: 2 | Status: SHIPPED | OUTPATIENT
Start: 2019-11-01 | End: 2021-06-25 | Stop reason: SDUPTHER

## 2019-11-01 NOTE — TELEPHONE ENCOUNTER
Was the patient seen in the last year in this department? Yes    Does patient have an active prescription for medications requested? No     Received Request Via: Pharmacy    last seen 08/05/2019

## 2020-01-07 RX ORDER — LEVOTHYROXINE SODIUM 0.1 MG/1
TABLET ORAL
Qty: 180 TAB | Refills: 3 | Status: SHIPPED | OUTPATIENT
Start: 2020-01-07 | End: 2021-06-25 | Stop reason: SDUPTHER

## 2020-01-07 NOTE — TELEPHONE ENCOUNTER
Was the patient seen in the last year in this department? Yes   08/05/19  Does patient have an active prescription for medications requested? No     Received Request Via: Pharmacy     Levothyroxine 100mcg qty 135

## 2020-02-03 ENCOUNTER — OFFICE VISIT (OUTPATIENT)
Dept: ENDOCRINOLOGY | Facility: MEDICAL CENTER | Age: 60
End: 2020-02-03
Payer: COMMERCIAL

## 2020-02-03 VITALS
BODY MASS INDEX: 31.01 KG/M2 | HEART RATE: 70 BPM | SYSTOLIC BLOOD PRESSURE: 152 MMHG | DIASTOLIC BLOOD PRESSURE: 94 MMHG | OXYGEN SATURATION: 96 % | WEIGHT: 234 LBS | HEIGHT: 73 IN

## 2020-02-03 DIAGNOSIS — E23.0 HYPOPITUITARISM (HCC): ICD-10-CM

## 2020-02-03 DIAGNOSIS — D35.2 PITUITARY MACROADENOMA (HCC): ICD-10-CM

## 2020-02-03 DIAGNOSIS — E22.1 HYPERPROLACTINEMIA (HCC): ICD-10-CM

## 2020-02-03 DIAGNOSIS — R53.83 FATIGUE, UNSPECIFIED TYPE: ICD-10-CM

## 2020-02-03 DIAGNOSIS — R63.5 WEIGHT GAIN: ICD-10-CM

## 2020-02-03 DIAGNOSIS — Z12.5 SPECIAL SCREENING FOR MALIGNANT NEOPLASM OF PROSTATE: ICD-10-CM

## 2020-02-03 PROCEDURE — 99214 OFFICE O/P EST MOD 30 MIN: CPT | Performed by: INTERNAL MEDICINE
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
## 2020-03-10 ENCOUNTER — TELEPHONE (OUTPATIENT)
Dept: ENDOCRINOLOGY | Facility: MEDICAL CENTER | Age: 60
End: 2020-03-10

## 2020-03-11 NOTE — TELEPHONE ENCOUNTER
Telephone conversation with patient    Laboratory data reviewed and in general it is all very good CBC and chemistry panel and lipids are in good range.  PSA is at a low level below 1.    Prolactin is up at 87 but we know from last year's MRI that he does not have significant tumor there.  So he will take what cabergoline he can tolerate.  Vitamin D is a bit low at 27 so I asked him to take at least 2000 IU of vitamin D3.    B12 is also low at 166 but he is not anemic and is not macrocytic so I do not think this is serious.  I told him to take vitamin B12 orally 500 mg a day will be plenty and we will check that next time in.    Dario Caballero M.D.

## 2020-03-18 RX ORDER — CABERGOLINE 0.5 MG/1
TABLET ORAL
Qty: 50 TAB | Refills: 3 | Status: SHIPPED | OUTPATIENT
Start: 2020-03-18 | End: 2021-04-05 | Stop reason: SDUPTHER

## 2020-08-03 ENCOUNTER — OFFICE VISIT (OUTPATIENT)
Dept: ENDOCRINOLOGY | Facility: MEDICAL CENTER | Age: 60
End: 2020-08-03
Attending: INTERNAL MEDICINE
Payer: COMMERCIAL

## 2020-08-03 ENCOUNTER — HOSPITAL ENCOUNTER (OUTPATIENT)
Dept: LAB | Facility: MEDICAL CENTER | Age: 60
End: 2020-08-03
Attending: INTERNAL MEDICINE
Payer: COMMERCIAL

## 2020-08-03 VITALS
HEIGHT: 73 IN | DIASTOLIC BLOOD PRESSURE: 82 MMHG | SYSTOLIC BLOOD PRESSURE: 130 MMHG | BODY MASS INDEX: 31.91 KG/M2 | HEART RATE: 60 BPM | WEIGHT: 240.8 LBS

## 2020-08-03 DIAGNOSIS — E22.1 HYPERPROLACTINEMIA (HCC): ICD-10-CM

## 2020-08-03 DIAGNOSIS — E23.0 HYPOPITUITARISM (HCC): ICD-10-CM

## 2020-08-03 DIAGNOSIS — E55.9 VITAMIN D DEFICIENCY: ICD-10-CM

## 2020-08-03 DIAGNOSIS — E29.1 SECONDARY MALE HYPOGONADISM: ICD-10-CM

## 2020-08-03 DIAGNOSIS — D35.2 PITUITARY MACROADENOMA (HCC): ICD-10-CM

## 2020-08-03 DIAGNOSIS — Z12.5 SPECIAL SCREENING FOR MALIGNANT NEOPLASM OF PROSTATE: ICD-10-CM

## 2020-08-03 DIAGNOSIS — E53.8 VITAMIN B12 DEFICIENCY: ICD-10-CM

## 2020-08-03 LAB
25(OH)D3 SERPL-MCNC: 34 NG/ML (ref 30–100)
ALBUMIN SERPL BCP-MCNC: 4.6 G/DL (ref 3.2–4.9)
ALBUMIN/GLOB SERPL: 1.7 G/DL
ALP SERPL-CCNC: 62 U/L (ref 30–99)
ALT SERPL-CCNC: 20 U/L (ref 2–50)
ANION GAP SERPL CALC-SCNC: 9 MMOL/L (ref 7–16)
AST SERPL-CCNC: 21 U/L (ref 12–45)
BILIRUB SERPL-MCNC: 0.9 MG/DL (ref 0.1–1.5)
BUN SERPL-MCNC: 14 MG/DL (ref 8–22)
CALCIUM SERPL-MCNC: 9.7 MG/DL (ref 8.4–10.2)
CHLORIDE SERPL-SCNC: 104 MMOL/L (ref 96–112)
CO2 SERPL-SCNC: 27 MMOL/L (ref 20–33)
CREAT SERPL-MCNC: 0.95 MG/DL (ref 0.5–1.4)
ESTRADIOL SERPL-MCNC: 12.9 PG/ML
FASTING STATUS PATIENT QL REPORTED: NORMAL
GLOBULIN SER CALC-MCNC: 2.7 G/DL (ref 1.9–3.5)
GLUCOSE SERPL-MCNC: 94 MG/DL (ref 65–99)
POTASSIUM SERPL-SCNC: 4.4 MMOL/L (ref 3.6–5.5)
PROLACTIN SERPL-MCNC: 71.6 NG/ML (ref 2.1–17.7)
PROT SERPL-MCNC: 7.3 G/DL (ref 6–8.2)
SODIUM SERPL-SCNC: 140 MMOL/L (ref 135–145)
T4 FREE SERPL-MCNC: 1.27 NG/DL (ref 0.93–1.7)
TSH SERPL DL<=0.005 MIU/L-ACNC: 0.19 UIU/ML (ref 0.38–5.33)
VIT B12 SERPL-MCNC: 470 PG/ML (ref 211–911)

## 2020-08-03 PROCEDURE — 82306 VITAMIN D 25 HYDROXY: CPT

## 2020-08-03 PROCEDURE — 82607 VITAMIN B-12: CPT

## 2020-08-03 PROCEDURE — 84443 ASSAY THYROID STIM HORMONE: CPT

## 2020-08-03 PROCEDURE — 84146 ASSAY OF PROLACTIN: CPT

## 2020-08-03 PROCEDURE — 99214 OFFICE O/P EST MOD 30 MIN: CPT | Performed by: INTERNAL MEDICINE

## 2020-08-03 PROCEDURE — 99211 OFF/OP EST MAY X REQ PHY/QHP: CPT | Performed by: INTERNAL MEDICINE

## 2020-08-03 PROCEDURE — 84305 ASSAY OF SOMATOMEDIN: CPT

## 2020-08-03 PROCEDURE — 80053 COMPREHEN METABOLIC PANEL: CPT

## 2020-08-03 PROCEDURE — 36415 COLL VENOUS BLD VENIPUNCTURE: CPT

## 2020-08-03 PROCEDURE — 84439 ASSAY OF FREE THYROXINE: CPT

## 2020-08-03 PROCEDURE — 82670 ASSAY OF TOTAL ESTRADIOL: CPT

## 2020-08-03 PROCEDURE — 84402 ASSAY OF FREE TESTOSTERONE: CPT

## 2020-08-03 ASSESSMENT — FIBROSIS 4 INDEX: FIB4 SCORE: 1.56

## 2020-08-03 NOTE — PROGRESS NOTES
Chief Complaint   Patient presents with   • Pituitary Adenoma     Prolactinoma   • Hypopituitarism   • Vitamin B12 Deficiency        HPI:        Pituitary status reviewed in detail.            Most recent pituitary MRI from last year was remarkably good.  He continues to have headaches that are difficult to understand.  I suggested a neurologist and he thought he would do that when the weather gets cooler if the headaches did not improve.  I do not think it relates to his small pituitary tumor and I doubt that it relates to cabergoline          He is not able to tolerate sufficient cabergoline to control his prolactin level down to the normal range.  Last level in February his prolactin was 87.  He is taking cabergoline 0.75 mg twice a week.          Currently taking Synthroid 137 mcg/day.  In February free T4 was 1.1.  TSH was low at 0.2 but that may relate to his pituitary tumor.          Hydrocortisone is 20 mg a.m. and 10 mg p.m.  Electrolytes have been in good balance.         Taking growth hormone 0.2 mg/day and IGF-I in good range at 139 ().                    He gets his eyes and visual fields checked every year              Also noted last time was vitamin D deficiency and B12 deficiency without anemia.  He is currently taking supplements of both but cannot tell me the doses.  I will update lab and further converse.  Vitamin D level was 27 and vitamin B 12 was 166    ROS:  Headache is his main symptomatic complaint.  He works through it.  I did encourage him to see a neurologist and he thinks he did well.      Allergies: No Known Allergies    Current medicines including changes today:  Current Outpatient Medications   Medication Sig Dispense Refill   • cabergoline (DOSTINEX) 0.5 MG tablet TAKE 1 AND 1/2 TABLETS, 2 TIMES A WEEK, FOR ABNORMALLY INCREASED LEVELS OF PROLACTIN IN THE BLOOD 50 Tab 3   • hydrocortisone (CORTEF) 10 MG Tab TAKE 2 TABLETS BY MOUTH IN THE MORNING AND 1 TABLET IN THE EVENING. 270  "Tab 1   • famotidine (PEPCID) 20 MG Tab Take 1 tablet by mouth twice daily. 180 Tab 1   • NEEDLE, DISP, 18 G 18G X 1\" Misc Use to draw testosterone 10 Each 6   • NEEDLE, DISP, 23 G 23G X 1\" Misc Use to administer testosterone every 14 days 10 Each 6   • Syringe 3 ML Misc Use on every 14 days 10 Each 6   • cabergoline (DOSTINEX) 0.5 MG tablet Take 0.25 mg by mouth every Monday and Friday. Indications: Abnormally Increased Levels of Prolactin in the Blood     • famotidine (PEPCID) 20 MG Tab Take 1 Tab by mouth 2 times a day. 180 Tab 3     No current facility-administered medications for this visit.         Past Medical History:   Diagnosis Date   • GERD (gastroesophageal reflux disease)    • Hyperprolactinemia (HCC)    • Hypopituitarism (HCC)    • Pituitary adenoma (HCC) 2005    transsphenoidal hypophysectomy   • rectal nodule     mucosal cyst       PHYSICAL EXAM:    /82 (BP Location: Left arm, Patient Position: Sitting, BP Cuff Size: Adult)   Pulse 60   Ht 1.854 m (6' 0.99\")   Wt 109.2 kg (240 lb 12.8 oz)   BMI 31.78 kg/m²     Examination limited due to COVID-19 restrictions  Gen.   appears healthy    Skin   appropriate for sex and age    HEENT visual fields are full to gross confrontation.    Neck   thyroid gland is small and difficult to palpate.    Breasts    normal male fatty tissue no gynecomastia or tenderness    Heart  regular    Extremities  no edema    Neuro  gait and station normal    Psych  appropriate    ASSESSMENT AND RECOMMENDATIONS    1. Pituitary macroadenoma (HCC)            Last MRI last year showed only a small remnant with distortion of the pituitary stalk    2. Secondary male hypogonadism                Using AndroGel 2 pumps per day                 Update lab    3. Hypopituitarism (HCC)               See HPI    4. Hyperprolactinemia (HCC)              Poor tolerance for cabergoline.  Can only tolerate 0.75 mg twice weekly and last prolactin was 87    5. Special screening for malignant " neoplasm of prostate           No examination done today last PSA satisfactory at 0.9    6. Vitamin D deficiency           Taking his supplement but does not know the dose    7. Vitamin B12 deficiency              Taking a supplement but does not know the dose       DISPOSITION: Update lab and report by telephone.                             If stable return in 6 months      Dario Caballero M.D.    Copies to: Pcp Pt States None None

## 2020-08-04 LAB — TESTOST FREE SERPL-MCNC: 20 PG/ML (ref 47–244)

## 2020-08-05 LAB
IGF-I SERPL-MCNC: 126 NG/ML (ref 55–203)
IGF-I Z-SCORE SERPL: 0.2

## 2020-11-30 DIAGNOSIS — K21.00 GERD WITH ESOPHAGITIS: ICD-10-CM

## 2020-11-30 DIAGNOSIS — D35.2 PITUITARY MACROADENOMA (HCC): ICD-10-CM

## 2020-11-30 RX ORDER — FAMOTIDINE 20 MG/1
20 TABLET, FILM COATED ORAL 2 TIMES DAILY
Qty: 180 TAB | Refills: 3 | Status: SHIPPED | OUTPATIENT
Start: 2020-11-30 | End: 2021-09-21

## 2020-11-30 RX ORDER — HYDROCORTISONE 10 MG/1
TABLET ORAL
Qty: 270 TAB | Refills: 1 | Status: SHIPPED | OUTPATIENT
Start: 2020-11-30 | End: 2021-04-05 | Stop reason: SDUPTHER

## 2020-12-09 ENCOUNTER — TELEPHONE (OUTPATIENT)
Dept: ENDOCRINOLOGY | Facility: MEDICAL CENTER | Age: 60
End: 2020-12-09

## 2020-12-09 NOTE — TELEPHONE ENCOUNTER
Telephone conversation with pharmacy.    Hydrocortisone 10 mg is not available.  Pharmacy will substitute 5 mg for 10 mg tablets and make the patient aware the change in pill size but not the dose.    Dario Caballero M.D.

## 2021-01-20 ENCOUNTER — TELEPHONE (OUTPATIENT)
Dept: ENDOCRINOLOGY | Facility: MEDICAL CENTER | Age: 61
End: 2021-01-20

## 2021-01-20 DIAGNOSIS — E55.9 VITAMIN D DEFICIENCY: ICD-10-CM

## 2021-01-20 DIAGNOSIS — E53.8 VITAMIN B12 DEFICIENCY: ICD-10-CM

## 2021-01-20 DIAGNOSIS — D35.2 PITUITARY MACROADENOMA (HCC): ICD-10-CM

## 2021-01-20 DIAGNOSIS — Z12.5 SPECIAL SCREENING FOR MALIGNANT NEOPLASM OF PROSTATE: ICD-10-CM

## 2021-01-20 DIAGNOSIS — E22.1 HYPERPROLACTINEMIA (HCC): ICD-10-CM

## 2021-01-20 DIAGNOSIS — E23.0 HYPOPITUITARISM (HCC): ICD-10-CM

## 2021-02-01 ENCOUNTER — APPOINTMENT (OUTPATIENT)
Dept: ENDOCRINOLOGY | Facility: MEDICAL CENTER | Age: 61
End: 2021-02-01
Attending: INTERNAL MEDICINE
Payer: COMMERCIAL

## 2021-04-05 DIAGNOSIS — E23.0 HYPOPITUITARISM (HCC): ICD-10-CM

## 2021-04-05 DIAGNOSIS — D35.2 PITUITARY MACROADENOMA (HCC): ICD-10-CM

## 2021-04-05 DIAGNOSIS — E22.1 HYPERPROLACTINEMIA (HCC): ICD-10-CM

## 2021-04-05 RX ORDER — HYDROCORTISONE 10 MG/1
TABLET ORAL
Qty: 270 TABLET | Refills: 0 | Status: SHIPPED | OUTPATIENT
Start: 2021-04-05 | End: 2022-08-02 | Stop reason: SDUPTHER

## 2021-04-05 RX ORDER — CABERGOLINE 0.5 MG/1
TABLET ORAL
Qty: 36 TABLET | Refills: 0 | Status: SHIPPED | OUTPATIENT
Start: 2021-04-05 | End: 2022-08-02

## 2021-06-14 ENCOUNTER — TELEPHONE (OUTPATIENT)
Dept: ENDOCRINOLOGY | Facility: MEDICAL CENTER | Age: 61
End: 2021-06-14

## 2021-06-14 DIAGNOSIS — E23.0 HYPOPITUITARISM (HCC): ICD-10-CM

## 2021-06-14 RX ORDER — CABERGOLINE 0.5 MG/1
0.25 TABLET ORAL
Qty: 8 TABLET | Refills: 5 | Status: SHIPPED | OUTPATIENT
Start: 2021-06-14 | End: 2021-06-30 | Stop reason: SDUPTHER

## 2021-06-14 NOTE — TELEPHONE ENCOUNTER
Received request via: Pharmacy    Was the patient seen in the last year in this department? Yes    Does the patient have an active prescription (recently filled or refills available) for medication(s) requested? No     cabergoline (DOSTINEX) 0.5 MG tablet 36 tablet 0/0 4/5/2021    Sig: TAKE 1 AND 1/2 TABLETS, 2 TIMES A WEEK, FOR ABNORMALLY INCREASED LEVELS OF PROLACTIN IN THE BLOOD

## 2021-06-16 ENCOUNTER — TELEPHONE (OUTPATIENT)
Dept: ENDOCRINOLOGY | Facility: MEDICAL CENTER | Age: 61
End: 2021-06-16

## 2021-06-16 NOTE — TELEPHONE ENCOUNTER
Pt called and LVM, he would like a call from Dr Caballero, he stated his reception on his phone is not good.. He has a shoulder surgery on Monday and hernia surgery the following week and would like to speak to him before these surgeries. Please leave him a message if he doesn't anser and he stated he will call back.. js

## 2021-06-21 ENCOUNTER — TELEPHONE (OUTPATIENT)
Dept: ENDOCRINOLOGY | Facility: MEDICAL CENTER | Age: 61
End: 2021-06-21

## 2021-06-21 DIAGNOSIS — E29.1 SECONDARY MALE HYPOGONADISM: ICD-10-CM

## 2021-06-21 RX ORDER — TESTOSTERONE CYPIONATE 200 MG/ML
100 INJECTION, SOLUTION INTRAMUSCULAR
Qty: 3 ML | Refills: 2 | Status: CANCELLED | OUTPATIENT
Start: 2021-06-21 | End: 2021-09-13

## 2021-06-22 NOTE — TELEPHONE ENCOUNTER
Telephone conversation with patient    Patient injured his shoulder and just had a repair today.  I am talking to him on the phone while he is in the hospital.  He plans on going home tomorrow.  Does not sound like he had any adrenal insufficiency.  As a matter fact his blood pressures relatively high today.  When he goes home if he feels weak woozy dizzy low blood pressure he should double and triple his hydrocortisone otherwise I think he will be okay.  He also has a inguinal hernia repair coming up via the scope so that should not be too traumatic.  He will stay in touch.    Dario Caballero M.D.

## 2021-06-25 DIAGNOSIS — E23.0 HYPOPITUITARISM (HCC): ICD-10-CM

## 2021-06-25 DIAGNOSIS — E29.1 SECONDARY MALE HYPOGONADISM: ICD-10-CM

## 2021-06-25 RX ORDER — TESTOSTERONE CYPIONATE 200 MG/ML
100 INJECTION, SOLUTION INTRAMUSCULAR
Qty: 3 ML | Refills: 0 | Status: SHIPPED | OUTPATIENT
Start: 2021-06-25 | End: 2021-09-17

## 2021-06-25 RX ORDER — LEVOTHYROXINE SODIUM 0.1 MG/1
TABLET ORAL
Qty: 180 TABLET | Refills: 1 | Status: SHIPPED | OUTPATIENT
Start: 2021-06-25 | End: 2021-09-24

## 2021-06-30 ENCOUNTER — TELEPHONE (OUTPATIENT)
Dept: ENDOCRINOLOGY | Facility: MEDICAL CENTER | Age: 61
End: 2021-06-30

## 2021-06-30 DIAGNOSIS — E23.0 HYPOPITUITARISM (HCC): ICD-10-CM

## 2021-06-30 RX ORDER — CABERGOLINE 0.5 MG/1
0.25 TABLET ORAL
Qty: 8 TABLET | Refills: 5 | Status: SHIPPED | OUTPATIENT
Start: 2021-07-02 | End: 2021-09-21

## 2021-06-30 NOTE — TELEPHONE ENCOUNTER
Pt. Called stating he is now taking 1.5 pills weekly. Please send down updated RX to [facility].

## 2021-07-01 DIAGNOSIS — E22.1 HYPERPROLACTINEMIA (HCC): ICD-10-CM

## 2021-07-01 RX ORDER — CABERGOLINE 0.5 MG/1
0.75 TABLET ORAL
Qty: 12 TABLET | Refills: 5 | Status: SHIPPED | OUTPATIENT
Start: 2021-07-01 | End: 2021-09-13 | Stop reason: SDUPTHER

## 2021-09-07 ENCOUNTER — HOSPITAL ENCOUNTER (OUTPATIENT)
Dept: LAB | Facility: MEDICAL CENTER | Age: 61
End: 2021-09-07
Attending: INTERNAL MEDICINE
Payer: COMMERCIAL

## 2021-09-07 DIAGNOSIS — E23.0 HYPOPITUITARISM (HCC): ICD-10-CM

## 2021-09-07 DIAGNOSIS — Z12.5 SPECIAL SCREENING FOR MALIGNANT NEOPLASM OF PROSTATE: ICD-10-CM

## 2021-09-07 DIAGNOSIS — E55.9 VITAMIN D DEFICIENCY: ICD-10-CM

## 2021-09-07 DIAGNOSIS — D35.2 PITUITARY MACROADENOMA (HCC): ICD-10-CM

## 2021-09-07 LAB
25(OH)D3 SERPL-MCNC: 31 NG/ML (ref 30–100)
ALBUMIN SERPL BCP-MCNC: 4.3 G/DL (ref 3.2–4.9)
ALBUMIN/GLOB SERPL: 1.8 G/DL
ALP SERPL-CCNC: 75 U/L (ref 30–99)
ALT SERPL-CCNC: 21 U/L (ref 2–50)
ANION GAP SERPL CALC-SCNC: 12 MMOL/L (ref 7–16)
AST SERPL-CCNC: 19 U/L (ref 12–45)
BILIRUB SERPL-MCNC: 1.1 MG/DL (ref 0.1–1.5)
BUN SERPL-MCNC: 15 MG/DL (ref 8–22)
CALCIUM SERPL-MCNC: 9.6 MG/DL (ref 8.4–10.2)
CHLORIDE SERPL-SCNC: 104 MMOL/L (ref 96–112)
CO2 SERPL-SCNC: 22 MMOL/L (ref 20–33)
CREAT SERPL-MCNC: 0.76 MG/DL (ref 0.5–1.4)
FASTING STATUS PATIENT QL REPORTED: NORMAL
GLOBULIN SER CALC-MCNC: 2.4 G/DL (ref 1.9–3.5)
GLUCOSE SERPL-MCNC: 81 MG/DL (ref 65–99)
POTASSIUM SERPL-SCNC: 4.1 MMOL/L (ref 3.6–5.5)
PROT SERPL-MCNC: 6.7 G/DL (ref 6–8.2)
PSA SERPL-MCNC: 1.11 NG/ML (ref 0–4)
SODIUM SERPL-SCNC: 138 MMOL/L (ref 135–145)

## 2021-09-07 PROCEDURE — 84305 ASSAY OF SOMATOMEDIN: CPT

## 2021-09-07 PROCEDURE — 84153 ASSAY OF PSA TOTAL: CPT

## 2021-09-07 PROCEDURE — 36415 COLL VENOUS BLD VENIPUNCTURE: CPT

## 2021-09-07 PROCEDURE — 80053 COMPREHEN METABOLIC PANEL: CPT

## 2021-09-07 PROCEDURE — 82306 VITAMIN D 25 HYDROXY: CPT

## 2021-09-07 PROCEDURE — 84402 ASSAY OF FREE TESTOSTERONE: CPT

## 2021-09-09 LAB
IGF-I SERPL-MCNC: 142 NG/ML (ref 51–209)
IGF-I Z-SCORE SERPL: 0.6
TESTOST FREE SERPL-MCNC: 15 PG/ML (ref 47–244)

## 2021-09-13 DIAGNOSIS — E22.1 HYPERPROLACTINEMIA (HCC): ICD-10-CM

## 2021-09-13 DIAGNOSIS — D35.2 PITUITARY MACROADENOMA (HCC): ICD-10-CM

## 2021-09-13 RX ORDER — CABERGOLINE 0.5 MG/1
0.75 TABLET ORAL
Qty: 12 TABLET | Refills: 5 | Status: SHIPPED | OUTPATIENT
Start: 2021-09-13 | End: 2021-09-14 | Stop reason: SDUPTHER

## 2021-09-13 NOTE — TELEPHONE ENCOUNTER
Received request via: Pharmacy    Was the patient seen in the last year in this department? No  (last office visit: 08/03/2020)    Does the patient have an active prescription (recently filled or refills available) for medication(s) requested? No

## 2021-09-14 DIAGNOSIS — E22.1 HYPERPROLACTINEMIA (HCC): ICD-10-CM

## 2021-09-14 RX ORDER — CABERGOLINE 0.5 MG/1
0.75 TABLET ORAL
Qty: 36 TABLET | Refills: 3 | Status: SHIPPED | OUTPATIENT
Start: 2021-09-14 | End: 2022-08-02 | Stop reason: SDUPTHER

## 2021-09-21 ENCOUNTER — OFFICE VISIT (OUTPATIENT)
Dept: ENDOCRINOLOGY | Facility: MEDICAL CENTER | Age: 61
End: 2021-09-21
Attending: INTERNAL MEDICINE
Payer: COMMERCIAL

## 2021-09-21 ENCOUNTER — HOSPITAL ENCOUNTER (OUTPATIENT)
Dept: LAB | Facility: MEDICAL CENTER | Age: 61
End: 2021-09-21
Attending: INTERNAL MEDICINE
Payer: COMMERCIAL

## 2021-09-21 VITALS
DIASTOLIC BLOOD PRESSURE: 88 MMHG | OXYGEN SATURATION: 96 % | BODY MASS INDEX: 33.81 KG/M2 | HEART RATE: 83 BPM | SYSTOLIC BLOOD PRESSURE: 150 MMHG | WEIGHT: 249.6 LBS | RESPIRATION RATE: 16 BRPM | HEIGHT: 72 IN

## 2021-09-21 DIAGNOSIS — E23.0 HYPOPITUITARISM (HCC): ICD-10-CM

## 2021-09-21 DIAGNOSIS — E22.1 HYPERPROLACTINEMIA (HCC): ICD-10-CM

## 2021-09-21 DIAGNOSIS — D35.2 PITUITARY MACROADENOMA (HCC): ICD-10-CM

## 2021-09-21 LAB
ESTRADIOL SERPL-MCNC: 11.2 PG/ML
PROLACTIN SERPL-MCNC: 99.6 NG/ML (ref 2.1–17.7)
T4 FREE SERPL-MCNC: 1.24 NG/DL (ref 0.93–1.7)

## 2021-09-21 PROCEDURE — 84439 ASSAY OF FREE THYROXINE: CPT

## 2021-09-21 PROCEDURE — 36415 COLL VENOUS BLD VENIPUNCTURE: CPT

## 2021-09-21 PROCEDURE — 84402 ASSAY OF FREE TESTOSTERONE: CPT

## 2021-09-21 PROCEDURE — 84146 ASSAY OF PROLACTIN: CPT

## 2021-09-21 PROCEDURE — 99211 OFF/OP EST MAY X REQ PHY/QHP: CPT | Performed by: INTERNAL MEDICINE

## 2021-09-21 PROCEDURE — 82670 ASSAY OF TOTAL ESTRADIOL: CPT

## 2021-09-21 PROCEDURE — 99214 OFFICE O/P EST MOD 30 MIN: CPT | Performed by: INTERNAL MEDICINE

## 2021-09-21 RX ORDER — TESTOSTERONE CYPIONATE 200 MG/ML
50 INJECTION, SOLUTION INTRAMUSCULAR ONCE
COMMUNITY
End: 2021-09-21

## 2021-09-22 LAB — TESTOST FREE SERPL-MCNC: 79 PG/ML (ref 47–244)
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
## 2021-09-27 ENCOUNTER — TELEPHONE (OUTPATIENT)
Dept: ENDOCRINOLOGY | Facility: MEDICAL CENTER | Age: 61
End: 2021-09-27

## 2021-09-27 NOTE — TELEPHONE ENCOUNTER
Please send new prescription for LEVOTHYROXINE 100 mcg and testosterone to [place] pharmacy with a quantity of 180.       Thank you

## 2021-10-02 ENCOUNTER — TELEPHONE (OUTPATIENT)
Dept: ENDOCRINOLOGY | Facility: MEDICAL CENTER | Age: 61
End: 2021-10-02

## 2021-10-03 NOTE — TELEPHONE ENCOUNTER
Telephone call to [place] pharmacy:        Refilled medications             Levothyroxine 100 mcg #180                    100 mcg alternating with 150 mcg   QOD             Testosterone enanthate 200 mg/ml  # 6 vials                  100 mg (0.5 mL) every 2 weeks for 84 days                          famotidine  20 mg BID  # 180    Dario Caballero M.D.

## 2021-12-20 ENCOUNTER — TELEPHONE (OUTPATIENT)
Dept: ENDOCRINOLOGY | Facility: MEDICAL CENTER | Age: 61
End: 2021-12-20

## 2021-12-20 DIAGNOSIS — K31.89 HYPERACIDITY: ICD-10-CM

## 2021-12-20 DIAGNOSIS — E23.0 HYPOPITUITARISM (HCC): ICD-10-CM

## 2021-12-20 DIAGNOSIS — D35.2 PITUITARY MACROADENOMA (HCC): ICD-10-CM

## 2021-12-20 RX ORDER — FAMOTIDINE 20 MG/1
20 TABLET, FILM COATED ORAL 2 TIMES DAILY
Qty: 180 TABLET | Refills: 1 | Status: CANCELLED | OUTPATIENT
Start: 2021-12-20

## 2021-12-20 RX ORDER — HYDROCORTISONE 10 MG/1
TABLET ORAL
Qty: 270 TABLET | Refills: 0 | Status: CANCELLED | OUTPATIENT
Start: 2021-12-20

## 2021-12-20 RX ORDER — TESTOSTERONE CYPIONATE 200 MG/ML
50 INJECTION, SOLUTION INTRAMUSCULAR ONCE
Qty: 1.96 ML | Status: CANCELLED | OUTPATIENT
Start: 2021-12-20 | End: 2021-12-20

## 2021-12-20 NOTE — TELEPHONE ENCOUNTER
1. Caller Name: Nnamdi Millan                        Call Back Number: [phone]    2. Message: Patient called and stated you called him and wanted him to call you back before you left, to discuss all of this medical. Please give patient a call back at [phone] and he stated he will be going to bed at 5:00pm at he has to go into work at midnight.     Thank you.     3. Patient approves office to leave a detailed voicemail/MyChart message: yes

## 2021-12-20 NOTE — TELEPHONE ENCOUNTER
Received request via: Pharmacy    Was the patient seen in the last year in this department? Yes    Does the patient have an active prescription (recently filled or refills available) for medication(s) requested? No       REQUESTED MEDICATION:   Requested Prescriptions     Pending Prescriptions Disp Refills   • hydrocortisone (CORTEF) 10 MG Tab 270 Tablet 0     Sig: TAKE 2 TABLETS BY MOUTH IN THE MORNING AND 1 TABLET IN THE EVENING.  No future refills until does lab and makes appointment.   • famotidine (PEPCID) 20 MG Tab 180 Tablet 1     Sig: Take 1 Tablet by mouth 2 times a day.   • testosterone cypionate (DEPO-TESTOSTERONE) 200 MG/ML Solution injection 1.96 mL      Sig: Inject 0.25 mL into the shoulder, thigh, or buttocks one time for 1 dose.

## 2021-12-21 ENCOUNTER — TELEPHONE (OUTPATIENT)
Dept: ENDOCRINOLOGY | Facility: MEDICAL CENTER | Age: 61
End: 2021-12-21

## 2021-12-21 DIAGNOSIS — D35.2 PITUITARY MACROADENOMA (HCC): ICD-10-CM

## 2021-12-21 DIAGNOSIS — K31.89 HYPERACIDITY: ICD-10-CM

## 2021-12-21 DIAGNOSIS — E23.0 HYPOPITUITARISM (HCC): ICD-10-CM

## 2021-12-21 DIAGNOSIS — E29.1 SECONDARY MALE HYPOGONADISM: ICD-10-CM

## 2021-12-21 DIAGNOSIS — E22.1 HYPERPROLACTINEMIA (HCC): ICD-10-CM

## 2021-12-21 RX ORDER — LEVOTHYROXINE SODIUM 0.12 MG/1
125 TABLET ORAL
Qty: 90 TABLET | Refills: 3 | Status: SHIPPED | OUTPATIENT
Start: 2021-12-21 | End: 2022-08-02 | Stop reason: SDUPTHER

## 2021-12-21 RX ORDER — HYDROCORTISONE 10 MG/1
20 TABLET ORAL 2 TIMES DAILY
Qty: 360 TABLET | Refills: 2 | Status: SHIPPED | OUTPATIENT
Start: 2021-12-21 | End: 2022-08-02

## 2021-12-21 RX ORDER — FAMOTIDINE 20 MG/1
20 TABLET, FILM COATED ORAL 2 TIMES DAILY
Qty: 180 TABLET | Refills: 3 | Status: SHIPPED | OUTPATIENT
Start: 2021-12-21

## 2021-12-21 RX ORDER — TESTOSTERONE CYPIONATE 200 MG/ML
100 VIAL (ML) INTRAMUSCULAR
Qty: 3 ML | Refills: 2 | Status: SHIPPED | OUTPATIENT
Start: 2021-12-21 | End: 2022-03-02

## 2021-12-21 NOTE — TELEPHONE ENCOUNTER
Telephone conversation with patient    I reordered hydrocortisone.  He takes 20 mg in the morning and 10 in the evening as a routine.  We discussed stress dosing which she has not been doing regularly.  I think he has a better understanding about.    My prescription reads 20 mg twice a day so that he will have hydrocortisone leftover every month for doing stress dosing when necessary.    Dario Caballero M.D.

## 2021-12-21 NOTE — PROGRESS NOTES
Telephone conversation with patient.    Additional lab test are reviewed and arein good range except for the prolactin which is up to 99.  He is having difficulty tolerating the cabergoline so he does not take it regularly.  PSA in good range at 1.1.    He knows I am retiring and we will establish with another provider in our office in 6 months.  I will send lab orders.    Dario Caballero M.D.

## 2022-01-28 ENCOUNTER — TELEPHONE (OUTPATIENT)
Dept: ENDOCRINOLOGY | Facility: MEDICAL CENTER | Age: 62
End: 2022-01-28

## 2022-01-28 NOTE — TELEPHONE ENCOUNTER
Phone Number Called: [phone] (home)       Call outcome: Left detailed message for patient. Informed to call back with any additional questions.    Message: LM asking patient to please call us back to schedule an appointment with one of our providers per Dr Caballero's recommendation.  Also asked patient to please obtain a new referral from PCP.  YONG

## 2022-08-02 ENCOUNTER — HOSPITAL ENCOUNTER (OUTPATIENT)
Dept: LAB | Facility: MEDICAL CENTER | Age: 62
End: 2022-08-02
Attending: INTERNAL MEDICINE
Payer: COMMERCIAL

## 2022-08-02 ENCOUNTER — OFFICE VISIT (OUTPATIENT)
Dept: ENDOCRINOLOGY | Facility: MEDICAL CENTER | Age: 62
End: 2022-08-02
Attending: INTERNAL MEDICINE
Payer: COMMERCIAL

## 2022-08-02 VITALS
BODY MASS INDEX: 35.21 KG/M2 | SYSTOLIC BLOOD PRESSURE: 134 MMHG | OXYGEN SATURATION: 95 % | WEIGHT: 260 LBS | DIASTOLIC BLOOD PRESSURE: 70 MMHG | HEIGHT: 72 IN | HEART RATE: 62 BPM

## 2022-08-02 DIAGNOSIS — E55.9 VITAMIN D DEFICIENCY: ICD-10-CM

## 2022-08-02 DIAGNOSIS — E29.1 SECONDARY MALE HYPOGONADISM: ICD-10-CM

## 2022-08-02 DIAGNOSIS — E03.8 SECONDARY HYPOTHYROIDISM: ICD-10-CM

## 2022-08-02 DIAGNOSIS — E22.1 HYPERPROLACTINEMIA (HCC): ICD-10-CM

## 2022-08-02 DIAGNOSIS — E23.0 HYPOPITUITARISM (HCC): ICD-10-CM

## 2022-08-02 DIAGNOSIS — D35.2 PITUITARY MACROADENOMA (HCC): ICD-10-CM

## 2022-08-02 DIAGNOSIS — E27.49 SECONDARY ADRENAL INSUFFICIENCY (HCC): ICD-10-CM

## 2022-08-02 LAB
ALBUMIN SERPL BCP-MCNC: 4.6 G/DL (ref 3.2–4.9)
ALBUMIN/GLOB SERPL: 1.8 G/DL
ALP SERPL-CCNC: 78 U/L (ref 30–99)
ALT SERPL-CCNC: 21 U/L (ref 2–50)
ANION GAP SERPL CALC-SCNC: 9 MMOL/L (ref 7–16)
AST SERPL-CCNC: 17 U/L (ref 12–45)
BILIRUB SERPL-MCNC: 0.7 MG/DL (ref 0.1–1.5)
BUN SERPL-MCNC: 12 MG/DL (ref 8–22)
CALCIUM SERPL-MCNC: 9.2 MG/DL (ref 8.4–10.2)
CHLORIDE SERPL-SCNC: 107 MMOL/L (ref 96–112)
CO2 SERPL-SCNC: 23 MMOL/L (ref 20–33)
CREAT SERPL-MCNC: 0.8 MG/DL (ref 0.5–1.4)
GFR SERPLBLD CREATININE-BSD FMLA CKD-EPI: 100 ML/MIN/1.73 M 2
GLOBULIN SER CALC-MCNC: 2.5 G/DL (ref 1.9–3.5)
GLUCOSE SERPL-MCNC: 96 MG/DL (ref 65–99)
POTASSIUM SERPL-SCNC: 4.2 MMOL/L (ref 3.6–5.5)
PROLACTIN SERPL-MCNC: 60.9 NG/ML (ref 2.1–17.7)
PROT SERPL-MCNC: 7.1 G/DL (ref 6–8.2)
SODIUM SERPL-SCNC: 139 MMOL/L (ref 135–145)
T4 FREE SERPL-MCNC: 1.27 NG/DL (ref 0.93–1.7)
TSH SERPL DL<=0.005 MIU/L-ACNC: 0.23 UIU/ML (ref 0.38–5.33)

## 2022-08-02 PROCEDURE — 84439 ASSAY OF FREE THYROXINE: CPT

## 2022-08-02 PROCEDURE — 84443 ASSAY THYROID STIM HORMONE: CPT

## 2022-08-02 PROCEDURE — 99211 OFF/OP EST MAY X REQ PHY/QHP: CPT | Performed by: INTERNAL MEDICINE

## 2022-08-02 PROCEDURE — 83520 IMMUNOASSAY QUANT NOS NONAB: CPT

## 2022-08-02 PROCEDURE — 99214 OFFICE O/P EST MOD 30 MIN: CPT | Performed by: INTERNAL MEDICINE

## 2022-08-02 PROCEDURE — 84146 ASSAY OF PROLACTIN: CPT

## 2022-08-02 PROCEDURE — 84402 ASSAY OF FREE TESTOSTERONE: CPT

## 2022-08-02 PROCEDURE — 36415 COLL VENOUS BLD VENIPUNCTURE: CPT

## 2022-08-02 PROCEDURE — 82306 VITAMIN D 25 HYDROXY: CPT

## 2022-08-02 PROCEDURE — 84305 ASSAY OF SOMATOMEDIN: CPT

## 2022-08-02 PROCEDURE — 80053 COMPREHEN METABOLIC PANEL: CPT

## 2022-08-02 RX ORDER — FAMOTIDINE 10 MG
20 TABLET ORAL
COMMUNITY
End: 2022-08-02

## 2022-08-02 RX ORDER — CABERGOLINE 0.5 MG/1
0.5 TABLET ORAL
COMMUNITY
End: 2022-08-02

## 2022-08-02 RX ORDER — TESTOSTERONE CYPIONATE 200 MG/ML
100 INJECTION, SOLUTION INTRAMUSCULAR
Qty: 3 ML | Refills: 2 | Status: SHIPPED | OUTPATIENT
Start: 2022-08-02 | End: 2022-10-25

## 2022-08-02 RX ORDER — CABERGOLINE 0.5 MG/1
1 TABLET ORAL
Qty: 48 TABLET | Refills: 3 | Status: SHIPPED | OUTPATIENT
Start: 2022-08-02 | End: 2023-05-30

## 2022-08-02 RX ORDER — HYDROCORTISONE 10 MG/1
TABLET ORAL
Qty: 270 TABLET | Refills: 1 | Status: SHIPPED | OUTPATIENT
Start: 2022-08-02 | End: 2023-05-30

## 2022-08-02 RX ORDER — LEVOTHYROXINE SODIUM 0.12 MG/1
125 TABLET ORAL
Qty: 90 TABLET | Refills: 3 | Status: SHIPPED | OUTPATIENT
Start: 2022-08-02 | End: 2023-07-03 | Stop reason: SDUPTHER

## 2022-08-02 RX ORDER — LEVOTHYROXINE SODIUM 0.1 MG/1
100 TABLET ORAL
COMMUNITY
End: 2022-08-02

## 2022-08-02 ASSESSMENT — PATIENT HEALTH QUESTIONNAIRE - PHQ9: CLINICAL INTERPRETATION OF PHQ2 SCORE: 0

## 2022-08-02 NOTE — PROGRESS NOTES
Chief Complaint: Follow up for panhypopituitarism, pituitary macrodenoma s/p TSS     HPI:     Nnamdi Millan is a 61 y.o. 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 Baseline prolactin was 6320.   He continued to have significant hyperprolactinemia post surgery and was placed on dopamine agonist therapy.    He was seen previously by Dr. Caballero  This is my first time meeting him      Unfortunately he lives very far in [place] and he normally gets his labs done on the morning of his appointment and that he gets a call back about the test results      He had intolerance of cabergoline 0.75mg twice a week   (we dont have updated labs)  Last prolactin was 99 on 9/2021  He doesn't have GH deficiency  IGF-1  Was 142 on 9/2021  He denies headaches  He denies gynecomastia        He is on TRT with intramuscular injections 100mg every 2 weeks  He reports good libido and good energy levels  He gets 90-day supplies of his medications  We dont have labs      He is on HC 20mg AM and 10mg PM and he is aware of stress dosing  He reports that his blood pressure goes up if he does not take his hydrocortisone  We dont have labs      He is on Levothyroxine 125mcg daily   Last free T4 was 1.2 on 9/2021  He denies constipation and cold intolerance  We are getting a free T4 level today      With respect to his pituitary macroadenoma I do not have records of his last MRI   It was last completed in July 2019 in [place] but it did not show any residual tumor or was stable compared to previous imaging      Patient's medications, allergies, and social histories were reviewed and updated as appropriate.      ROS:     CONS:     No fever, no chills   EYES:     No diplopia, no blurry vision   CV:           No chest pain, no palpitations   PULM:     No SOB, no cough, no hemoptysis.   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)  2019-08: Migraine aura without headache  2019-07: Secondary male hypogonadism  2016-12: GERD with esophagitis  2009-11: Pituitary macroadenoma ([facility])  2009-11: Secondary hypothyroidism  2009-11: GERD (gastroesophageal reflux disease)  2009-11: Rectal lump  Hyperprolactinemia ([facility])      Past Surgical History:  History reviewed. No pertinent surgical history.     Allergies:  Patient has no known allergies.     Social History:  Social History     Tobacco Use   • Smoking status: Never Smoker   • Smokeless tobacco: Never Used   Substance Use Topics   • Alcohol use: No   • Drug use: No        Family History:   family history is not on file.      PHYSICAL EXAM:   Vital signs: /70 (BP Location: Left arm, Patient Position: Sitting, BP Cuff Size: Large adult)   Pulse 62   Ht 1.829 m (6')   Wt 118 kg (260 lb)   SpO2 95%   BMI 35.26 kg/m²   GENERAL: Well-developed, well-nourished in no apparent distress.   EYE:  No ocular asymmetry, PERRLA  HENT: Pink, moist mucous membranes.    NECK: No thyromegaly.   CHEST: no gynecomastia   CARDIOVASCULAR:  No murmurs  LUNGS: Clear breath sounds  ABDOMEN: Soft, nontender   EXTREMITIES: No clubbing, cyanosis, or edema.   NEUROLOGICAL: No gross focal motor abnormalities   LYMPH: No cervical adenopathy palpated.   SKIN: No rashes, lesions.       Labs:  Lab Results   Component Value Date/Time    SODIUM 138 09/07/2021 10:58 AM    POTASSIUM 4.1 09/07/2021 10:58 AM    CHLORIDE 104 09/07/2021 10:58 AM    CO2 22 09/07/2021 10:58 AM    ANION 12.0 09/07/2021 10:58 AM    GLUCOSE 81 09/07/2021 10:58 AM    BUN 15 09/07/2021 10:58 AM    CREATININE 0.76 09/07/2021 10:58 AM    CREATININE 0.9 04/28/2005 06:37 PM    CALCIUM 9.6 09/07/2021 10:58 AM    ASTSGOT 19 09/07/2021 10:58  AM    ALTSGPT 21 09/07/2021 10:58 AM    TBILIRUBIN 1.1 09/07/2021 10:58 AM    ALBUMIN 4.3 09/07/2021 10:58 AM    TOTPROTEIN 6.7 09/07/2021 10:58 AM    GLOBULIN 2.4 09/07/2021 10:58 AM    AGRATIO 1.8 09/07/2021 10:58 AM       Lab Results   Component Value Date/Time    SODIUM 138 09/07/2021 1058    POTASSIUM 4.1 09/07/2021 1058    CHLORIDE 104 09/07/2021 1058    CO2 22 09/07/2021 1058    GLUCOSE 81 09/07/2021 1058    BUN 15 09/07/2021 1058    CREATININE 0.76 09/07/2021 1058    CALCIUM 9.6 09/07/2021 1058    ANION 12.0 09/07/2021 1058       No results found for: CHOLSTRLTOT, TRIGLYCERIDE, HDL, LDL, CHOLHDLRAT, NONHDL    Lab Results   Component Value Date/Time    TSHULTRASEN 0.189 (L) 08/03/2020 1040     Lab Results   Component Value Date/Time    FREET4 1.24 09/21/2021 1005     No results found for: FREET3  No results found for: THYSTIMIG    No results found for: MICROSOMALA      Imaging:      ASSESSMENT/PLAN:     1. Hyperprolactinemia (HCC)  Uncontrolled  I am increasing his cabergoline to 1 mg twice a week  Prolactin levels are pending  Discussed the importance of compliance with therapy    2. Secondary adrenal insufficiency (HCC)  Serum electrolytes are pending  Reviewed stress dosing of steroids continue hydrocortisone 30 mg daily in divided doses for now      3. Secondary hypothyroidism  Free T4 levels are pending continue levothyroxine 125 MCG daily for now pending test results      4. Secondary male hypogonadism  Testosterone levels are pending  Continue same medication for now 100 mg every 2 weeks pending test results    5. Hypopituitarism (HCC)  See plan above    6. Vitamin D deficiency  We will check calcium vitamin D today      Return in about 6 months (around 2/2/2023).      Thank you kindly for allowing me to participate in the thyroid care plan for this patient.    Aren Foster MD, DERIAN, [facility]  08/02/22    CC:   Pcp Pt States None

## 2022-08-03 LAB
25(OH)D3 SERPL-MCNC: 34 NG/ML (ref 30–100)
TESTOST FREE SERPL-MCNC: 60 PG/ML (ref 47–244)

## 2022-08-03 NOTE — RESULT ENCOUNTER NOTE
Phone Number Called: [phone] (home)      Call outcome: Left detailed message for patient. Informed to call back with any additional questions.    Message: I have left message for patient to call us back to go over the following message.

## 2022-08-04 LAB
IGF-I SERPL-MCNC: 118 NG/ML (ref 51–209)
IGF-I Z-SCORE SERPL: 0

## 2022-08-12 LAB — MISCELLANEOUS LAB RESULT MISCLAB: NORMAL

## 2023-02-07 ENCOUNTER — APPOINTMENT (OUTPATIENT)
Dept: ENDOCRINOLOGY | Facility: MEDICAL CENTER | Age: 63
End: 2023-02-07
Attending: INTERNAL MEDICINE
Payer: COMMERCIAL

## 2023-02-22 DIAGNOSIS — K31.89 HYPERACIDITY: ICD-10-CM

## 2023-02-22 RX ORDER — FAMOTIDINE 20 MG/1
20 TABLET, FILM COATED ORAL 2 TIMES DAILY
Qty: 180 TABLET | Refills: 1 | Status: SHIPPED | OUTPATIENT
Start: 2023-02-22 | End: 2023-05-30

## 2023-04-04 ENCOUNTER — TELEPHONE (OUTPATIENT)
Dept: ENDOCRINOLOGY | Facility: MEDICAL CENTER | Age: 63
End: 2023-04-04

## 2023-04-04 DIAGNOSIS — E29.1 SECONDARY MALE HYPOGONADISM: ICD-10-CM

## 2023-04-04 DIAGNOSIS — D35.2 PITUITARY MACROADENOMA (HCC): ICD-10-CM

## 2023-04-04 DIAGNOSIS — E23.0 HYPOPITUITARISM (HCC): ICD-10-CM

## 2023-04-04 RX ORDER — TESTOSTERONE CYPIONATE 200 MG/ML
100 INJECTION, SOLUTION INTRAMUSCULAR
Qty: 4 ML | Refills: 5 | Status: SHIPPED | OUTPATIENT
Start: 2023-04-04 | End: 2023-05-02

## 2023-05-01 ENCOUNTER — HOSPITAL ENCOUNTER (OUTPATIENT)
Dept: RADIOLOGY | Facility: MEDICAL CENTER | Age: 63
End: 2023-05-01
Attending: INTERNAL MEDICINE
Payer: COMMERCIAL

## 2023-05-01 DIAGNOSIS — D35.2 PITUITARY MACROADENOMA (HCC): ICD-10-CM

## 2023-05-01 DIAGNOSIS — E23.0 HYPOPITUITARISM (HCC): ICD-10-CM

## 2023-05-01 PROCEDURE — 700117 HCHG RX CONTRAST REV CODE 255: Performed by: INTERNAL MEDICINE

## 2023-05-01 PROCEDURE — A9579 GAD-BASE MR CONTRAST NOS,1ML: HCPCS | Performed by: INTERNAL MEDICINE

## 2023-05-01 PROCEDURE — 70553 MRI BRAIN STEM W/O & W/DYE: CPT

## 2023-05-01 RX ADMIN — GADOTERIDOL 20 ML: 279.3 INJECTION, SOLUTION INTRAVENOUS at 09:18

## 2023-05-30 DIAGNOSIS — E27.49 SECONDARY ADRENAL INSUFFICIENCY (HCC): ICD-10-CM

## 2023-05-30 DIAGNOSIS — K31.89 HYPERACIDITY: ICD-10-CM

## 2023-05-30 DIAGNOSIS — E22.1 HYPERPROLACTINEMIA (HCC): ICD-10-CM

## 2023-05-30 RX ORDER — CABERGOLINE 0.5 MG/1
1 TABLET ORAL
Qty: 48 TABLET | Refills: 3 | Status: SHIPPED | OUTPATIENT
Start: 2023-05-30 | End: 2023-07-03 | Stop reason: SDUPTHER

## 2023-05-30 RX ORDER — HYDROCORTISONE 10 MG/1
TABLET ORAL
Qty: 270 TABLET | Refills: 1 | Status: SHIPPED | OUTPATIENT
Start: 2023-05-30 | End: 2023-07-03 | Stop reason: SDUPTHER

## 2023-05-30 RX ORDER — FAMOTIDINE 20 MG/1
20 TABLET, FILM COATED ORAL 2 TIMES DAILY
Qty: 180 TABLET | Refills: 1 | Status: SHIPPED | OUTPATIENT
Start: 2023-05-30 | End: 2023-12-04

## 2023-06-19 ENCOUNTER — TELEPHONE (OUTPATIENT)
Dept: ENDOCRINOLOGY | Facility: MEDICAL CENTER | Age: 63
End: 2023-06-19
Payer: COMMERCIAL

## 2023-06-19 NOTE — TELEPHONE ENCOUNTER
Left detailed voicemail in regards to pending labs that need to be completed prior to appointment.

## 2023-06-20 ENCOUNTER — TELEPHONE (OUTPATIENT)
Dept: ENDOCRINOLOGY | Facility: MEDICAL CENTER | Age: 63
End: 2023-06-20
Payer: COMMERCIAL

## 2023-06-22 ENCOUNTER — HOSPITAL ENCOUNTER (OUTPATIENT)
Dept: LAB | Facility: MEDICAL CENTER | Age: 63
End: 2023-06-22
Attending: INTERNAL MEDICINE
Payer: COMMERCIAL

## 2023-06-22 DIAGNOSIS — D35.2 PITUITARY MACROADENOMA (HCC): ICD-10-CM

## 2023-06-22 DIAGNOSIS — E29.1 SECONDARY MALE HYPOGONADISM: ICD-10-CM

## 2023-06-22 DIAGNOSIS — E23.0 HYPOPITUITARISM (HCC): ICD-10-CM

## 2023-06-22 LAB
25(OH)D3 SERPL-MCNC: 36 NG/ML (ref 30–100)
ALBUMIN SERPL BCP-MCNC: 4.3 G/DL (ref 3.2–4.9)
ALBUMIN/GLOB SERPL: 1.7 G/DL
ALP SERPL-CCNC: 74 U/L (ref 30–99)
ALT SERPL-CCNC: 19 U/L (ref 2–50)
ANION GAP SERPL CALC-SCNC: 12 MMOL/L (ref 7–16)
AST SERPL-CCNC: 17 U/L (ref 12–45)
BILIRUB SERPL-MCNC: 0.6 MG/DL (ref 0.1–1.5)
BUN SERPL-MCNC: 12 MG/DL (ref 8–22)
CALCIUM ALBUM COR SERPL-MCNC: 9.6 MG/DL (ref 8.5–10.5)
CALCIUM SERPL-MCNC: 9.8 MG/DL (ref 8.4–10.2)
CHLORIDE SERPL-SCNC: 108 MMOL/L (ref 96–112)
CO2 SERPL-SCNC: 25 MMOL/L (ref 20–33)
CREAT SERPL-MCNC: 0.9 MG/DL (ref 0.5–1.4)
GFR SERPLBLD CREATININE-BSD FMLA CKD-EPI: 96 ML/MIN/1.73 M 2
GLOBULIN SER CALC-MCNC: 2.6 G/DL (ref 1.9–3.5)
GLUCOSE SERPL-MCNC: 86 MG/DL (ref 65–99)
HCT VFR BLD AUTO: 43.7 % (ref 42–52)
HGB BLD-MCNC: 15.1 G/DL (ref 14–18)
POTASSIUM SERPL-SCNC: 4 MMOL/L (ref 3.6–5.5)
PROLACTIN SERPL-MCNC: 37.1 NG/ML (ref 2.1–17.7)
PROT SERPL-MCNC: 6.9 G/DL (ref 6–8.2)
SODIUM SERPL-SCNC: 145 MMOL/L (ref 135–145)
T4 FREE SERPL-MCNC: 1.16 NG/DL (ref 0.93–1.7)
TSH SERPL DL<=0.005 MIU/L-ACNC: 0.83 UIU/ML (ref 0.38–5.33)

## 2023-06-22 PROCEDURE — 80053 COMPREHEN METABOLIC PANEL: CPT

## 2023-06-22 PROCEDURE — 84403 ASSAY OF TOTAL TESTOSTERONE: CPT

## 2023-06-22 PROCEDURE — 84305 ASSAY OF SOMATOMEDIN: CPT

## 2023-06-22 PROCEDURE — 84443 ASSAY THYROID STIM HORMONE: CPT

## 2023-06-22 PROCEDURE — 84270 ASSAY OF SEX HORMONE GLOBUL: CPT

## 2023-06-22 PROCEDURE — 84146 ASSAY OF PROLACTIN: CPT

## 2023-06-22 PROCEDURE — 36415 COLL VENOUS BLD VENIPUNCTURE: CPT

## 2023-06-22 PROCEDURE — 84439 ASSAY OF FREE THYROXINE: CPT

## 2023-06-22 PROCEDURE — 84402 ASSAY OF FREE TESTOSTERONE: CPT

## 2023-06-22 PROCEDURE — 85014 HEMATOCRIT: CPT

## 2023-06-22 PROCEDURE — 85018 HEMOGLOBIN: CPT

## 2023-06-22 PROCEDURE — 82306 VITAMIN D 25 HYDROXY: CPT

## 2023-06-24 LAB
IGF-I SERPL-MCNC: 127 NG/ML (ref 49–214)
IGF-I Z-SCORE SERPL: 0.3
SHBG SERPL-SCNC: 30 NMOL/L (ref 19–76)
TESTOST FREE MFR SERPL: 1.8 % (ref 1.6–2.9)
TESTOST FREE SERPL-MCNC: 20 PG/ML (ref 47–244)
TESTOST SERPL-MCNC: 114 NG/DL (ref 300–720)

## 2023-07-03 ENCOUNTER — OFFICE VISIT (OUTPATIENT)
Dept: ENDOCRINOLOGY | Facility: MEDICAL CENTER | Age: 63
End: 2023-07-03
Attending: INTERNAL MEDICINE
Payer: COMMERCIAL

## 2023-07-03 VITALS
RESPIRATION RATE: 20 BRPM | BODY MASS INDEX: 36.6 KG/M2 | WEIGHT: 270.2 LBS | DIASTOLIC BLOOD PRESSURE: 74 MMHG | SYSTOLIC BLOOD PRESSURE: 132 MMHG | HEIGHT: 72 IN | HEART RATE: 82 BPM | OXYGEN SATURATION: 95 %

## 2023-07-03 DIAGNOSIS — E27.49 SECONDARY ADRENAL INSUFFICIENCY (HCC): ICD-10-CM

## 2023-07-03 DIAGNOSIS — E22.1 HYPERPROLACTINEMIA (HCC): ICD-10-CM

## 2023-07-03 DIAGNOSIS — E03.8 SECONDARY HYPOTHYROIDISM: ICD-10-CM

## 2023-07-03 DIAGNOSIS — D35.2 PITUITARY MACROADENOMA (HCC): ICD-10-CM

## 2023-07-03 DIAGNOSIS — E23.0 HYPOPITUITARISM (HCC): ICD-10-CM

## 2023-07-03 DIAGNOSIS — E29.1 SECONDARY MALE HYPOGONADISM: ICD-10-CM

## 2023-07-03 DIAGNOSIS — E55.9 VITAMIN D DEFICIENCY: ICD-10-CM

## 2023-07-03 PROCEDURE — 99215 OFFICE O/P EST HI 40 MIN: CPT | Performed by: INTERNAL MEDICINE

## 2023-07-03 PROCEDURE — 3075F SYST BP GE 130 - 139MM HG: CPT | Performed by: INTERNAL MEDICINE

## 2023-07-03 PROCEDURE — 3078F DIAST BP <80 MM HG: CPT | Performed by: INTERNAL MEDICINE

## 2023-07-03 PROCEDURE — 99211 OFF/OP EST MAY X REQ PHY/QHP: CPT | Performed by: INTERNAL MEDICINE

## 2023-07-03 RX ORDER — LEVOTHYROXINE SODIUM 0.12 MG/1
125 TABLET ORAL
Qty: 90 TABLET | Refills: 3 | Status: SHIPPED | OUTPATIENT
Start: 2023-07-03

## 2023-07-03 RX ORDER — HYDROCORTISONE 10 MG/1
TABLET ORAL
Qty: 300 TABLET | Refills: 3 | Status: SHIPPED | OUTPATIENT
Start: 2023-07-03

## 2023-07-03 RX ORDER — TESTOSTERONE CYPIONATE 200 MG/ML
100 INJECTION, SOLUTION INTRAMUSCULAR
Qty: 4 ML | Refills: 5 | Status: SHIPPED | OUTPATIENT
Start: 2023-07-03 | End: 2023-07-31

## 2023-07-03 RX ORDER — TESTOSTERONE CYPIONATE 200 MG/ML
INJECTION, SOLUTION INTRAMUSCULAR
COMMUNITY
Start: 2023-04-05 | End: 2023-07-03

## 2023-07-03 RX ORDER — TESTOSTERONE CYPIONATE 200 MG/ML
INJECTION, SOLUTION INTRAMUSCULAR
COMMUNITY
Start: 2023-05-30 | End: 2023-07-03 | Stop reason: SDUPTHER

## 2023-07-03 RX ORDER — CABERGOLINE 0.5 MG/1
1.5 TABLET ORAL
Qty: 72 TABLET | Refills: 3 | Status: SHIPPED | OUTPATIENT
Start: 2023-07-03

## 2023-07-03 RX ORDER — FAMOTIDINE 20 MG/1
1 TABLET, FILM COATED ORAL 2 TIMES DAILY
COMMUNITY
Start: 2023-02-22 | End: 2023-07-03

## 2023-07-03 RX ORDER — LEVOTHYROXINE SODIUM 0.12 MG/1
1 TABLET ORAL
COMMUNITY
Start: 2023-02-20 | End: 2023-07-03

## 2023-07-03 ASSESSMENT — PATIENT HEALTH QUESTIONNAIRE - PHQ9: CLINICAL INTERPRETATION OF PHQ2 SCORE: 0

## 2023-07-03 NOTE — PROGRESS NOTES
Chief Complaint: Follow up for panhypopituitarism, pituitary macrodenoma s/p TSS     HPI:     Nnamdi Millan is a 62 y.o. 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 Baseline prolactin was 6320.   He continued to have significant hyperprolactinemia post surgery and was placed on dopamine agonist therapy.    He was seen previously by Dr. Caballero  This is my first time meeting him      Unfortunately he lives very far in [place] and he normally gets his labs done on the morning of his appointment and that he gets a call back about the test results      He had intolerance of cabergoline 1.0mg twice a week   He reports good compliance   Prolactin was 99 on 9/2021  Prolactin was 61 on 8/2022  Prolactin is 37 on 6/2023    He doesn't have GH deficiency  IGF-1 was 142 on 9/2021  He denies headaches  He denies gynecomastia        He was on TRT with intramuscular injections 100mg every 2 weeks  He just switched recently to 0.,5mg every 7 days  His testosterone is low at 211 - as he was in the middle of the transition.       He is on HC 20mg AM and 10mg PM and   He is aware of stress dosing  He reports a history of high blood pressure when he misses his  HC dose   His CMP on 6/2023 is normal         He is on Levothyroxine 125mcg daily   Last free T4 was 1.16 on 6/2023   He denies constipation and cold intolerance        With respect to his pituitary macroadenoma   He had an MRI on 5/1/2023 showed normal postsurgical changes.        Patient's medications, allergies, and social histories were reviewed and updated as appropriate.      ROS:     CONS:     No fever, no chills   EYES:     No diplopia, no blurry vision   CV:           No chest pain, no palpitations   PULM:     No SOB, no cough, no hemoptysis.   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     Allergies:  Patient has no known allergies.     Social History:  Social History     Tobacco Use    Smoking status: Never    Smokeless tobacco: Never   Substance Use Topics    Alcohol use: No    Drug use: No        Family History:   family history is not on file.      PHYSICAL EXAM:   Vital signs: /74 (BP Location: Left arm, Patient Position: Sitting, BP Cuff Size: Adult)   Pulse 82   Resp 20   Ht 1.829 m (6')   Wt 123 kg (270 lb 3.2 oz)   SpO2 95%   BMI 36.65 kg/m²   GENERAL: Well-developed, well-nourished in no apparent distress.   EYE:  No ocular asymmetry, PERRLA  HENT: Pink, moist mucous membranes.    NECK: No thyromegaly.   CHEST: no gynecomastia   CARDIOVASCULAR:  No murmurs  LUNGS: Clear breath sounds  ABDOMEN: Soft, nontender   EXTREMITIES: No clubbing, cyanosis, or edema.   NEUROLOGICAL: No gross focal motor abnormalities   LYMPH: No cervical adenopathy palpated.   SKIN: No rashes, lesions.       Labs:  Lab Results   Component Value Date/Time    SODIUM 145 06/22/2023 02:59 PM    POTASSIUM 4.0 06/22/2023 02:59 PM    CHLORIDE 108 06/22/2023 02:59 PM    CO2 25 06/22/2023 02:59 PM    ANION 12.0 06/22/2023 02:59 PM    GLUCOSE 86 06/22/2023 02:59 PM    BUN 12 06/22/2023 02:59 PM    CREATININE 0.90 06/22/2023 02:59 PM    CREATININE 0.9 04/28/2005 06:37 PM    CALCIUM 9.8 06/22/2023 02:59 PM    ASTSGOT 17 06/22/2023 02:59 PM    ALTSGPT 19 06/22/2023 02:59 PM    TBILIRUBIN 0.6 06/22/2023 02:59 PM     ALBUMIN 4.3 2023 02:59 PM    TOTPROTEIN 6.9 2023 02:59 PM    GLOBULIN 2.6 2023 02:59 PM    AGRATIO 1.7 2023 02:59 PM       Lab Results   Component Value Date/Time    SODIUM 138 2021 1058    POTASSIUM 4.1 2021 1058    CHLORIDE 104 2021 1058    CO2 22 2021 1058    GLUCOSE 81 2021 1058    BUN 15 2021 1058    CREATININE 0.76 2021 1058    CALCIUM 9.6 2021 1058    ANION 12.0 2021 1058       No results found for: CHOLSTRLTOT, TRIGLYCERIDE, HDL, LDL, CHOLHDLRAT, NONHDL    Lab Results   Component Value Date/Time    TSHULTRASEN 0.189 (L) 2020 1040     Lab Results   Component Value Date/Time    FREET4 1.24 2021 1005     No results found for: FREET3  No results found for: THYSTIMIG    No results found for: MICROSOMALA      Imagin2023 8:21 AM     HISTORY/REASON FOR EXAM:  follow up for pituitary tumor        TECHNIQUE/EXAM DESCRIPTION:  MRI of the brain and pituitary without and with contrast.     T2 axial images were obtained of the whole brain. Thin-section T2 fast spin-echo coronal, T1 coronal, T1 postcontrast coronal, T1 sagittal, and T1 postcontrast sagittal images were obtained of the pituitary gland and sella.     The study was performed on a [employer] 1.5 Jenny MRI scanner.     20 mL ProHance contrast was administered intravenously.     COMPARISON:  2019     FINDINGS:  Stable post surgical changes transsphenoidal surgery with an expanded sella. The pituitary stalk is deviated to the left, unchanged. There is stable traction effect and ptotic appearance of the optic chiasm extending prominently into the sella. There is   no progressive mass to suggest recurrent macroadenoma.     Remainder of the brain is stable in appearance. No mass effect, midline shift or hydrocephalus. No acute infarct on DWI. No intracranial hemorrhage or extra-axial fluid collection.     Proximal vascular flow voids are patent.     Orbital  contents are symmetric.  There is mucosal thickening in the ethmoid sinuses, improved from prior.     IMPRESSION:     1.  Stable expanded postoperative appearance of the sella as detailed above. No progressive mass is seen to suggest recurrent adenoma.  2.  No acute or new brain abnormality.       ASSESSMENT/PLAN:     1. Hyperprolactinemia (HCC)  Improved   prolactin levels are better but still suboptimal 37  I am increasing his cabergoline to 1.5 mg twice a week  Discussed the importance of compliance with therapy  Continue monitoring prolactin levels.      2. Secondary adrenal insufficiency (HCC)  Stable  Continue HC 30mg daily in divided doses.   Reviewed stress dosing of steroids      3. Secondary hypothyroidism  Free T4 levels are controlled  TSH levels are unreliable because of history of pituitary surgery  Continue Levothyroxine 125 MCG daily   Repeat labs in 6 months       4. Secondary male hypogonadism  Stable   Testosterone levels are currently low because he just transition from every 2 weeks every week injection  Continue IM testosterone 100 mg every  weeks   Repeat labs in 6 months       5. Hypopituitarism (HCC)  See plan above      6. Vitamin D deficiency  Stable  Vitamin D labs were reviewed with the patient  Continue monitoring       Return in about 1 year (around 7/3/2024).      Total time spent on day of service was over 60 minutes which included obtaining a detailed history and physical exam, ordering labs, coordinating care and scheduling future follow-up    Thank you kindly for allowing me to participate in the thyroid care plan for this patient.    Aren Foster MD, FACE, [facility]      CC:   Pcp Pt States None

## 2023-07-05 ENCOUNTER — TELEPHONE (OUTPATIENT)
Dept: PHARMACY | Facility: MEDICAL CENTER | Age: 63
End: 2023-07-05
Payer: COMMERCIAL

## 2023-07-05 NOTE — TELEPHONE ENCOUNTER
Spoke with patient to offer our Pharmacy he declined he lives 2 hours away from Tommy Harvey, Pharmacy Liasion /RX Coordinator

## 2023-12-04 DIAGNOSIS — K31.89 HYPERACIDITY: ICD-10-CM

## 2023-12-04 RX ORDER — FAMOTIDINE 20 MG/1
20 TABLET, FILM COATED ORAL 2 TIMES DAILY
Qty: 180 TABLET | Refills: 1 | Status: SHIPPED | OUTPATIENT
Start: 2023-12-04

## 2024-01-15 ENCOUNTER — TELEPHONE (OUTPATIENT)
Dept: ENDOCRINOLOGY | Facility: MEDICAL CENTER | Age: 64
End: 2024-01-15
Payer: COMMERCIAL

## 2024-01-15 DIAGNOSIS — E29.1 SECONDARY MALE HYPOGONADISM: ICD-10-CM

## 2024-01-15 RX ORDER — TESTOSTERONE CYPIONATE 200 MG/ML
100 INJECTION, SOLUTION INTRAMUSCULAR
Qty: 4 ML | Refills: 5 | Status: SHIPPED | OUTPATIENT
Start: 2024-01-15 | End: 2024-02-12

## 2024-02-21 ENCOUNTER — TELEPHONE (OUTPATIENT)
Dept: ENDOCRINOLOGY | Facility: MEDICAL CENTER | Age: 64
End: 2024-02-21
Payer: COMMERCIAL

## 2024-02-21 NOTE — TELEPHONE ENCOUNTER
Left a detailed message to inform pt that Dr. Foster is going to be out of the country in July and that they need to be r/s to either June or August.

## 2024-05-30 DIAGNOSIS — E03.8 SECONDARY HYPOTHYROIDISM: ICD-10-CM

## 2024-05-30 RX ORDER — LEVOTHYROXINE SODIUM 0.12 MG/1
125 TABLET ORAL
Qty: 90 TABLET | Refills: 0 | Status: SHIPPED | OUTPATIENT
Start: 2024-05-30

## 2024-06-05 DIAGNOSIS — K31.89 HYPERACIDITY: ICD-10-CM

## 2024-06-05 DIAGNOSIS — E22.1 HYPERPROLACTINEMIA (HCC): ICD-10-CM

## 2024-06-05 RX ORDER — FAMOTIDINE 20 MG/1
20 TABLET, FILM COATED ORAL 2 TIMES DAILY
Qty: 180 TABLET | Refills: 0 | Status: SHIPPED | OUTPATIENT
Start: 2024-06-05

## 2024-06-05 RX ORDER — CABERGOLINE 0.5 MG/1
TABLET ORAL
Qty: 72 TABLET | Refills: 0 | Status: SHIPPED | OUTPATIENT
Start: 2024-06-05 | End: 2024-06-24 | Stop reason: SDUPTHER

## 2024-06-17 ENCOUNTER — HOSPITAL ENCOUNTER (OUTPATIENT)
Dept: LAB | Facility: MEDICAL CENTER | Age: 64
End: 2024-06-17
Attending: INTERNAL MEDICINE
Payer: COMMERCIAL

## 2024-06-17 DIAGNOSIS — D35.2 PITUITARY MACROADENOMA (HCC): ICD-10-CM

## 2024-06-17 DIAGNOSIS — E23.0 HYPOPITUITARISM (HCC): ICD-10-CM

## 2024-06-17 DIAGNOSIS — E29.1 SECONDARY MALE HYPOGONADISM: ICD-10-CM

## 2024-06-17 DIAGNOSIS — E22.1 HYPERPROLACTINEMIA (HCC): ICD-10-CM

## 2024-06-17 DIAGNOSIS — E03.8 SECONDARY HYPOTHYROIDISM: ICD-10-CM

## 2024-06-17 DIAGNOSIS — E55.9 VITAMIN D DEFICIENCY: ICD-10-CM

## 2024-06-17 DIAGNOSIS — E27.49 SECONDARY ADRENAL INSUFFICIENCY (HCC): ICD-10-CM

## 2024-06-17 LAB
25(OH)D3 SERPL-MCNC: 32 NG/ML (ref 30–100)
ALBUMIN SERPL BCP-MCNC: 4.5 G/DL (ref 3.2–4.9)
ALBUMIN/GLOB SERPL: 1.6 G/DL
ALP SERPL-CCNC: 65 U/L (ref 30–99)
ALT SERPL-CCNC: 20 U/L (ref 2–50)
ANION GAP SERPL CALC-SCNC: 11 MMOL/L (ref 7–16)
AST SERPL-CCNC: 27 U/L (ref 12–45)
BILIRUB SERPL-MCNC: 1.3 MG/DL (ref 0.1–1.5)
BUN SERPL-MCNC: 11 MG/DL (ref 8–22)
CALCIUM ALBUM COR SERPL-MCNC: 8.9 MG/DL (ref 8.5–10.5)
CALCIUM SERPL-MCNC: 9.3 MG/DL (ref 8.4–10.2)
CHLORIDE SERPL-SCNC: 104 MMOL/L (ref 96–112)
CO2 SERPL-SCNC: 25 MMOL/L (ref 20–33)
CREAT SERPL-MCNC: 0.88 MG/DL (ref 0.5–1.4)
FASTING STATUS PATIENT QL REPORTED: NORMAL
GFR SERPLBLD CREATININE-BSD FMLA CKD-EPI: 96 ML/MIN/1.73 M 2
GLOBULIN SER CALC-MCNC: 2.9 G/DL (ref 1.9–3.5)
GLUCOSE SERPL-MCNC: 89 MG/DL (ref 65–99)
HCT VFR BLD AUTO: 46.4 % (ref 42–52)
HGB BLD-MCNC: 16.5 G/DL (ref 14–18)
POTASSIUM SERPL-SCNC: 4.1 MMOL/L (ref 3.6–5.5)
PROLACTIN SERPL-MCNC: 49.5 NG/ML (ref 2.1–17.7)
PROT SERPL-MCNC: 7.4 G/DL (ref 6–8.2)
SODIUM SERPL-SCNC: 140 MMOL/L (ref 135–145)
T4 FREE SERPL-MCNC: 1.12 NG/DL (ref 0.93–1.7)
TSH SERPL DL<=0.005 MIU/L-ACNC: 0.68 UIU/ML (ref 0.38–5.33)

## 2024-06-17 PROCEDURE — 80053 COMPREHEN METABOLIC PANEL: CPT

## 2024-06-17 PROCEDURE — 84305 ASSAY OF SOMATOMEDIN: CPT

## 2024-06-17 PROCEDURE — 84439 ASSAY OF FREE THYROXINE: CPT

## 2024-06-17 PROCEDURE — 84403 ASSAY OF TOTAL TESTOSTERONE: CPT

## 2024-06-17 PROCEDURE — 36415 COLL VENOUS BLD VENIPUNCTURE: CPT

## 2024-06-17 PROCEDURE — 84270 ASSAY OF SEX HORMONE GLOBUL: CPT

## 2024-06-17 PROCEDURE — 85014 HEMATOCRIT: CPT

## 2024-06-17 PROCEDURE — 85018 HEMOGLOBIN: CPT

## 2024-06-17 PROCEDURE — 84402 ASSAY OF FREE TESTOSTERONE: CPT

## 2024-06-17 PROCEDURE — 84443 ASSAY THYROID STIM HORMONE: CPT

## 2024-06-17 PROCEDURE — 84146 ASSAY OF PROLACTIN: CPT

## 2024-06-17 PROCEDURE — 82306 VITAMIN D 25 HYDROXY: CPT

## 2024-06-19 LAB
IGF-I SERPL-MCNC: 142 NG/ML (ref 46–219)
IGF-I Z-SCORE SERPL: 0.6
SHBG SERPL-SCNC: 29 NMOL/L (ref 19–76)
TESTOST FREE MFR SERPL: 2 % (ref 1.6–2.9)
TESTOST FREE SERPL-MCNC: 82 PG/ML (ref 47–244)
TESTOST SERPL-MCNC: 412 NG/DL (ref 300–720)

## 2024-06-24 ENCOUNTER — TELEPHONE (OUTPATIENT)
Dept: ENDOCRINOLOGY | Facility: MEDICAL CENTER | Age: 64
End: 2024-06-24
Payer: COMMERCIAL

## 2024-06-24 ENCOUNTER — OFFICE VISIT (OUTPATIENT)
Dept: ENDOCRINOLOGY | Facility: MEDICAL CENTER | Age: 64
End: 2024-06-24
Attending: INTERNAL MEDICINE
Payer: COMMERCIAL

## 2024-06-24 VITALS
SYSTOLIC BLOOD PRESSURE: 152 MMHG | DIASTOLIC BLOOD PRESSURE: 72 MMHG | BODY MASS INDEX: 36.7 KG/M2 | HEIGHT: 72 IN | WEIGHT: 271 LBS | HEART RATE: 69 BPM | OXYGEN SATURATION: 95 %

## 2024-06-24 DIAGNOSIS — D35.2 PITUITARY MACROADENOMA (HCC): ICD-10-CM

## 2024-06-24 DIAGNOSIS — E27.49 SECONDARY ADRENAL INSUFFICIENCY (HCC): ICD-10-CM

## 2024-06-24 DIAGNOSIS — E22.1 HYPERPROLACTINEMIA (HCC): ICD-10-CM

## 2024-06-24 DIAGNOSIS — E23.0 HYPOPITUITARISM (HCC): ICD-10-CM

## 2024-06-24 DIAGNOSIS — E03.8 SECONDARY HYPOTHYROIDISM: ICD-10-CM

## 2024-06-24 DIAGNOSIS — E55.9 VITAMIN D DEFICIENCY: ICD-10-CM

## 2024-06-24 DIAGNOSIS — E29.1 SECONDARY MALE HYPOGONADISM: ICD-10-CM

## 2024-06-24 PROCEDURE — 3078F DIAST BP <80 MM HG: CPT | Performed by: INTERNAL MEDICINE

## 2024-06-24 PROCEDURE — 99215 OFFICE O/P EST HI 40 MIN: CPT | Performed by: INTERNAL MEDICINE

## 2024-06-24 PROCEDURE — 3077F SYST BP >= 140 MM HG: CPT | Performed by: INTERNAL MEDICINE

## 2024-06-24 PROCEDURE — 99211 OFF/OP EST MAY X REQ PHY/QHP: CPT | Performed by: INTERNAL MEDICINE

## 2024-06-24 RX ORDER — HYDROCORTISONE 10 MG/1
TABLET ORAL
Qty: 300 TABLET | Refills: 3 | Status: SHIPPED | OUTPATIENT
Start: 2024-06-24

## 2024-06-24 RX ORDER — LEVOTHYROXINE SODIUM 137 UG/1
137 TABLET ORAL
Qty: 90 TABLET | Refills: 2 | Status: SHIPPED | OUTPATIENT
Start: 2024-06-24

## 2024-06-24 RX ORDER — TESTOSTERONE CYPIONATE 200 MG/ML
100 INJECTION, SOLUTION INTRAMUSCULAR
Qty: 4 ML | Refills: 5 | Status: SHIPPED | OUTPATIENT
Start: 2024-06-24 | End: 2024-07-22

## 2024-06-24 RX ORDER — CABERGOLINE 0.5 MG/1
TABLET ORAL
Qty: 72 TABLET | Refills: 2 | Status: SHIPPED | OUTPATIENT
Start: 2024-06-24

## 2024-06-24 NOTE — PROGRESS NOTES
Chief Complaint: Follow up for panhypopituitarism, pituitary macrodenoma s/p TSS     HPI:     Nnamdi Millan is a 63 y.o. 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 Baseline prolactin was 6320.   He continued to have significant hyperprolactinemia post surgery and was placed on dopamine agonist therapy.    He was seen previously by Dr. Caballero    He lives in [place] and he normally gets his labs done on the morning of his appointment and that he gets a call back about the test results        He has chronic bilateral hip pain which has affected his BP ( BP is high due to pain)  He doesn't see pain management      He is on cabergoline 1.5mg twice a week   He reports good compliance   He denies headaches  He denies gynecomastia    Prolactin was 99 on 9/2021  Prolactin was 61 on 8/2022  Prolactin is 37 on 6/2023 ( 1mg 2 x a week)  Prolactin is 49 on 6/2024 (1.5mg 2 x a week)    He doesn't have GH deficiency  IGF-1 was 142 on 9/2021  IGF-1 is 142 on 6/2024          He was on TRT with intramuscular injections 100mg every 2 weeks but we switched  He is on  0.5ml or 100mg  every 7 days  His total testosterone is 412 on 6/2024  HCT is 46%      He is on HC 20mg AM and 10mg PM and   He is aware of stress dosing  His CMP on 6/2024 is normal   He reports weight gain due to decreased physical activity  His BP is elevated      He is on Levothyroxine 125mcg daily   He denies constipation and cold intolerance  Last free T4 was 1.12 on 6/2024         With respect to his pituitary macroadenoma   He had an MRI on 5/1/2023 showed normal postsurgical changes with no evidence of recurrent adenoma        Patient's medications, allergies, and social histories were reviewed and updated as appropriate.      ROS:     CONS:     No fever, no chills   EYES:     No diplopia, no blurry vision   CV:           No chest pain, no palpitations   PULM:     No  SOB, no cough, no hemoptysis.   GI:            No nausea, no vomiting, no diarrhea, no constipation   ENDO:     No polyuria, no polydipsia, no heat intolerance, no cold intolerance       Past Medical History:  Problem List:  2022-08: Secondary adrenal insufficiency (HCC)  2022-08: Adult onset growth hormone deficiency (HCC)  2019-08: Migraine aura without headache  2019-07: Secondary male hypogonadism  2016-12: GERD with esophagitis  2009-11: Pituitary macroadenoma ([facility])  2009-11: Secondary hypothyroidism  2009-11: GERD (gastroesophageal reflux disease)  2009-11: Rectal lump  Hyperprolactinemia ([facility])      Past Surgical History:  History reviewed. No pertinent surgical history.     Allergies:  Patient has no known allergies.     Social History:  Social History     Tobacco Use    Smoking status: Never    Smokeless tobacco: Never   Substance Use Topics    Alcohol use: No    Drug use: No        Family History:   family history is not on file.      PHYSICAL EXAM:   Vital signs: BP (!) 152/72 (BP Location: Left arm, Patient Position: Sitting, BP Cuff Size: Large adult)   Pulse 69   Ht 1.829 m (6')   Wt 123 kg (271 lb)   SpO2 95%   BMI 36.75 kg/m²   GENERAL: Well-developed, well-nourished in no apparent distress.   EYE:  No ocular asymmetry, PERRLA  HENT: Pink, moist mucous membranes.    NECK: No thyromegaly.   CHEST: no gynecomastia   CARDIOVASCULAR:  No murmurs  LUNGS: Clear breath sounds  ABDOMEN: Soft, nontender   EXTREMITIES: No clubbing, cyanosis, or edema.   NEUROLOGICAL: No gross focal motor abnormalities   LYMPH: No cervical adenopathy palpated.   SKIN: No rashes, lesions.       Labs:  Lab Results   Component Value Date/Time    SODIUM 140 06/17/2024 12:40 PM    POTASSIUM 4.1 06/17/2024 12:40 PM    CHLORIDE 104 06/17/2024 12:40 PM    CO2 25 06/17/2024 12:40 PM    ANION 11.0 06/17/2024 12:40 PM    GLUCOSE 89 06/17/2024 12:40 PM    BUN 11 06/17/2024 12:40 PM    CREATININE 0.88 06/17/2024 12:40 PM    CREATININE  0.9 2005 06:37 PM    CALCIUM 9.3 2024 12:40 PM    ASTSGOT 27 2024 12:40 PM    ALTSGPT 20 2024 12:40 PM    TBILIRUBIN 1.3 2024 12:40 PM    ALBUMIN 4.5 2024 12:40 PM    TOTPROTEIN 7.4 2024 12:40 PM    GLOBULIN 2.9 2024 12:40 PM    AGRATIO 1.6 2024 12:40 PM       Lab Results   Component Value Date/Time    SODIUM 138 2021 1058    POTASSIUM 4.1 2021 1058    CHLORIDE 104 2021 1058    CO2 22 2021 1058    GLUCOSE 81 2021 1058    BUN 15 2021 1058    CREATININE 0.76 2021 1058    CALCIUM 9.6 2021 1058    ANION 12.0 2021 1058       No results found for: CHOLSTRLTOT, TRIGLYCERIDE, HDL, LDL, CHOLHDLRAT, NONHDL    Lab Results   Component Value Date/Time    TSHULTRASEN 0.189 (L) 2020 1040     Lab Results   Component Value Date/Time    FREET4 1.24 2021 1005     No results found for: FREET3  No results found for: THYSTIMIG    No results found for: MICROSOMALA      Imagin2023 8:21 AM     HISTORY/REASON FOR EXAM:  follow up for pituitary tumor        TECHNIQUE/EXAM DESCRIPTION:  MRI of the brain and pituitary without and with contrast.     T2 axial images were obtained of the whole brain. Thin-section T2 fast spin-echo coronal, T1 coronal, T1 postcontrast coronal, T1 sagittal, and T1 postcontrast sagittal images were obtained of the pituitary gland and sella.     The study was performed on a [employer] 1.5 Jenny MRI scanner.     20 mL ProHance contrast was administered intravenously.     COMPARISON:  2019     FINDINGS:  Stable post surgical changes transsphenoidal surgery with an expanded sella. The pituitary stalk is deviated to the left, unchanged. There is stable traction effect and ptotic appearance of the optic chiasm extending prominently into the sella. There is   no progressive mass to suggest recurrent macroadenoma.     Remainder of the brain is stable in appearance. No mass effect, midline shift  or hydrocephalus. No acute infarct on DWI. No intracranial hemorrhage or extra-axial fluid collection.     Proximal vascular flow voids are patent.     Orbital contents are symmetric.  There is mucosal thickening in the ethmoid sinuses, improved from prior.     IMPRESSION:     1.  Stable expanded postoperative appearance of the sella as detailed above. No progressive mass is seen to suggest recurrent adenoma.  2.  No acute or new brain abnormality.       ASSESSMENT/PLAN:     1. Hyperprolactinemia (HCC)  Improved   Prolactin levels are high but better compared to previous levels 1-2 years ago  Continue  cabergoline 1.5 mg twice a week  Discussed the importance of compliance with therapy  Continue monitoring prolactin levels.      2. Secondary adrenal insufficiency (HCC)  Stable  Reduce  HC 20mg daily in divided doses due to wet gain and elevated blood pressure  He will take 50 mg in the morning and 5 mg in the afternoon  Reviewed stress dosing of steroids      3. Secondary hypothyroidism  Free T4 levels are controlled  TSH levels are unreliable because of history of pituitary surgery  Because of weight gain will increase his thyroid hormone to target a free T4 in the higher end of normal  increase Levothyroxine 137 MCG daily   Repeat labs in 6-12 months       4. Secondary male hypogonadism  Stable   Testosterone levels are normal  Continue IM testosterone 100 mg every  weeks   Repeat labs in 6 months       5. Hypopituitarism (HCC)  See plan above      6. Vitamin D deficiency  Stable  Vitamin D labs were reviewed with the patient  Continue monitoring       Return in about 10 months (around 4/24/2025).      Total time spent on day of service was over 60 minutes which included obtaining a detailed history and physical exam, ordering labs, coordinating care and scheduling future follow-up    Thank you kindly for allowing me to participate in the thyroid care plan for this patient.    Aren Foster MD, FACE,  [place]      CC:   Pcp Pt States None

## 2024-12-02 DIAGNOSIS — E03.8 SECONDARY HYPOTHYROIDISM: ICD-10-CM

## 2024-12-02 DIAGNOSIS — K31.89 HYPERACIDITY: ICD-10-CM

## 2024-12-03 RX ORDER — FAMOTIDINE 20 MG/1
20 TABLET, FILM COATED ORAL 2 TIMES DAILY
Qty: 180 TABLET | Refills: 0 | Status: SHIPPED | OUTPATIENT
Start: 2024-12-03

## 2024-12-03 RX ORDER — LEVOTHYROXINE SODIUM 137 UG/1
137 TABLET ORAL
Qty: 90 TABLET | Refills: 2 | Status: SHIPPED | OUTPATIENT
Start: 2024-12-03

## 2025-02-06 ENCOUNTER — TELEPHONE (OUTPATIENT)
Dept: ENDOCRINOLOGY | Facility: MEDICAL CENTER | Age: 65
End: 2025-02-06
Payer: COMMERCIAL

## 2025-02-06 DIAGNOSIS — E03.8 SECONDARY HYPOTHYROIDISM: ICD-10-CM

## 2025-02-06 RX ORDER — LEVOTHYROXINE SODIUM 137 UG/1
137 TABLET ORAL
Qty: 90 TABLET | Refills: 2 | Status: SHIPPED | OUTPATIENT
Start: 2025-02-06

## 2025-02-06 NOTE — TELEPHONE ENCOUNTER
Patient misunderstood insturctions    He was supposed to go up to 125 to 137 mcg daily    But he misunderstood instructions  - he has been taking 137 mcg 1.5tabs daily which was not the original instruction    I will refill his med    And clarified that he should be taking 137 mcg daily

## 2025-02-12 ENCOUNTER — TELEPHONE (OUTPATIENT)
Dept: ENDOCRINOLOGY | Facility: MEDICAL CENTER | Age: 65
End: 2025-02-12
Payer: COMMERCIAL

## 2025-02-12 DIAGNOSIS — E29.1 SECONDARY MALE HYPOGONADISM: ICD-10-CM

## 2025-02-12 RX ORDER — TESTOSTERONE CYPIONATE 200 MG/ML
100 INJECTION, SOLUTION INTRAMUSCULAR
Qty: 4 ML | Refills: 5 | Status: SHIPPED | OUTPATIENT
Start: 2025-02-12 | End: 2025-03-12

## 2025-02-12 NOTE — TELEPHONE ENCOUNTER
VOICEMAIL  1. Caller Name: Nnamdi                      Call Back Number: [phone]    2. Message: patient left a voicemail very upset regarding not getting scheduled for an appt.    3. Patient approves office to leave a detailed voicemail/MyChart message: N\A

## 2025-02-13 NOTE — TELEPHONE ENCOUNTER
Pt called back regarding scheduling appointment with Dr. Foster and refill for Testosterone. MA let pt know that she will make Dr. Foster aware of refill and in the mean time we can get him scheduled. MA offered available dates of Tomorrow or the soonest Monday ( which the pt states is the only day of the week would work) September 15 at 4:40. Pt states this is not going to work and that this is reniculus. MA apologized and explained that  is scheduling out to September. But that we can schedule him for this date and put him on a cancellations list for a sooner appointment if someone cancels. Pt states that MA needs to speak with provider regarding this and get him in in April. MA states that the provider is aware of his schedule booking out to September. Pt states he will call provider himself.

## 2025-03-11 DIAGNOSIS — K31.89 HYPERACIDITY: ICD-10-CM

## 2025-03-11 RX ORDER — FAMOTIDINE 20 MG/1
20 TABLET, FILM COATED ORAL 2 TIMES DAILY
Qty: 60 TABLET | Refills: 0 | Status: SHIPPED | OUTPATIENT
Start: 2025-03-11

## 2025-04-28 ENCOUNTER — HOSPITAL ENCOUNTER (OUTPATIENT)
Facility: MEDICAL CENTER | Age: 65
End: 2025-04-28
Attending: INTERNAL MEDICINE
Payer: COMMERCIAL

## 2025-04-28 DIAGNOSIS — E27.49 SECONDARY ADRENAL INSUFFICIENCY (HCC): ICD-10-CM

## 2025-04-28 DIAGNOSIS — D35.2 PITUITARY MACROADENOMA (HCC): ICD-10-CM

## 2025-04-28 DIAGNOSIS — E23.0 HYPOPITUITARISM (HCC): ICD-10-CM

## 2025-04-28 DIAGNOSIS — E22.1 HYPERPROLACTINEMIA (HCC): ICD-10-CM

## 2025-04-28 DIAGNOSIS — E03.8 SECONDARY HYPOTHYROIDISM: ICD-10-CM

## 2025-04-28 DIAGNOSIS — E55.9 VITAMIN D DEFICIENCY: ICD-10-CM

## 2025-04-28 DIAGNOSIS — E29.1 SECONDARY MALE HYPOGONADISM: ICD-10-CM

## 2025-04-28 LAB
25(OH)D3 SERPL-MCNC: 39 NG/ML (ref 30–100)
ALBUMIN SERPL BCP-MCNC: 4.5 G/DL (ref 3.2–4.9)
ALBUMIN/GLOB SERPL: 1.5 G/DL
ALP SERPL-CCNC: 66 U/L (ref 30–99)
ALT SERPL-CCNC: 24 U/L (ref 2–50)
ANION GAP SERPL CALC-SCNC: 12 MMOL/L (ref 7–16)
AST SERPL-CCNC: 25 U/L (ref 12–45)
BILIRUB SERPL-MCNC: 1 MG/DL (ref 0.1–1.5)
BUN SERPL-MCNC: 12 MG/DL (ref 8–22)
CALCIUM ALBUM COR SERPL-MCNC: 9.3 MG/DL (ref 8.5–10.5)
CALCIUM SERPL-MCNC: 9.7 MG/DL (ref 8.5–10.5)
CHLORIDE SERPL-SCNC: 104 MMOL/L (ref 96–112)
CO2 SERPL-SCNC: 23 MMOL/L (ref 20–33)
CREAT SERPL-MCNC: 1.05 MG/DL (ref 0.5–1.4)
FASTING STATUS PATIENT QL REPORTED: NORMAL
GFR SERPLBLD CREATININE-BSD FMLA CKD-EPI: 79 ML/MIN/1.73 M 2
GLOBULIN SER CALC-MCNC: 3 G/DL (ref 1.9–3.5)
GLUCOSE SERPL-MCNC: 95 MG/DL (ref 65–99)
HCT VFR BLD AUTO: 48.2 % (ref 42–52)
HGB BLD-MCNC: 16.4 G/DL (ref 14–18)
POTASSIUM SERPL-SCNC: 4.2 MMOL/L (ref 3.6–5.5)
PROLACTIN SERPL-MCNC: 52.5 NG/ML (ref 2.1–17.7)
PROT SERPL-MCNC: 7.5 G/DL (ref 6–8.2)
PSA SERPL-MCNC: 2.16 NG/ML (ref 0–4)
SODIUM SERPL-SCNC: 139 MMOL/L (ref 135–145)
T4 FREE SERPL-MCNC: 1.11 NG/DL (ref 0.93–1.7)

## 2025-04-28 PROCEDURE — 84403 ASSAY OF TOTAL TESTOSTERONE: CPT

## 2025-04-28 PROCEDURE — 84439 ASSAY OF FREE THYROXINE: CPT

## 2025-04-28 PROCEDURE — 84153 ASSAY OF PSA TOTAL: CPT

## 2025-04-28 PROCEDURE — 84146 ASSAY OF PROLACTIN: CPT

## 2025-04-28 PROCEDURE — 84270 ASSAY OF SEX HORMONE GLOBUL: CPT

## 2025-04-28 PROCEDURE — 85014 HEMATOCRIT: CPT

## 2025-04-28 PROCEDURE — 84402 ASSAY OF FREE TESTOSTERONE: CPT

## 2025-04-28 PROCEDURE — 85018 HEMOGLOBIN: CPT

## 2025-04-28 PROCEDURE — 36415 COLL VENOUS BLD VENIPUNCTURE: CPT

## 2025-04-28 PROCEDURE — 82306 VITAMIN D 25 HYDROXY: CPT

## 2025-04-28 PROCEDURE — 80053 COMPREHEN METABOLIC PANEL: CPT

## 2025-04-30 LAB
SHBG SERPL-SCNC: 35 NMOL/L (ref 19–76)
TESTOST FREE MFR SERPL: 1.8 % (ref 1.6–2.9)
TESTOST FREE SERPL-MCNC: 86 PG/ML (ref 47–244)
TESTOST SERPL-MCNC: 474 NG/DL (ref 300–720)

## 2025-05-19 ENCOUNTER — OFFICE VISIT (OUTPATIENT)
Dept: ENDOCRINOLOGY | Facility: MEDICAL CENTER | Age: 65
End: 2025-05-19
Attending: INTERNAL MEDICINE
Payer: COMMERCIAL

## 2025-05-19 VITALS
HEART RATE: 54 BPM | SYSTOLIC BLOOD PRESSURE: 148 MMHG | OXYGEN SATURATION: 97 % | BODY MASS INDEX: 34 KG/M2 | HEIGHT: 72 IN | DIASTOLIC BLOOD PRESSURE: 78 MMHG | WEIGHT: 251 LBS

## 2025-05-19 DIAGNOSIS — E22.1 HYPERPROLACTINEMIA (HCC): Primary | ICD-10-CM

## 2025-05-19 DIAGNOSIS — E03.8 SECONDARY HYPOTHYROIDISM: ICD-10-CM

## 2025-05-19 DIAGNOSIS — E55.9 VITAMIN D DEFICIENCY: ICD-10-CM

## 2025-05-19 DIAGNOSIS — K31.89 HYPERACIDITY: ICD-10-CM

## 2025-05-19 DIAGNOSIS — E27.49 SECONDARY ADRENAL INSUFFICIENCY (HCC): ICD-10-CM

## 2025-05-19 DIAGNOSIS — E29.1 SECONDARY MALE HYPOGONADISM: ICD-10-CM

## 2025-05-19 DIAGNOSIS — D35.2 PITUITARY MACROADENOMA (HCC): ICD-10-CM

## 2025-05-19 PROCEDURE — 99213 OFFICE O/P EST LOW 20 MIN: CPT | Performed by: INTERNAL MEDICINE

## 2025-05-19 RX ORDER — TESTOSTERONE CYPIONATE 200 MG/ML
100 INJECTION, SOLUTION INTRAMUSCULAR
Qty: 4 ML | Refills: 5 | Status: SHIPPED | OUTPATIENT
Start: 2025-05-19 | End: 2025-06-16

## 2025-05-19 RX ORDER — CABERGOLINE 0.5 MG/1
TABLET ORAL
Qty: 72 TABLET | Refills: 3 | Status: SHIPPED | OUTPATIENT
Start: 2025-05-19

## 2025-05-19 RX ORDER — LEVOTHYROXINE SODIUM 137 UG/1
137 TABLET ORAL
Qty: 90 TABLET | Refills: 3 | Status: SHIPPED | OUTPATIENT
Start: 2025-05-19

## 2025-05-19 RX ORDER — FAMOTIDINE 20 MG/1
20 TABLET, FILM COATED ORAL 2 TIMES DAILY
Qty: 180 TABLET | Refills: 3 | Status: SHIPPED | OUTPATIENT
Start: 2025-05-19

## 2025-05-19 NOTE — PROGRESS NOTES
Chief Complaint: Follow up for panhypopituitarism, pituitary macrodenoma s/p TSS     HPI:     Nnamdi Millan is a 64 y.o. 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 Baseline prolactin was 6320.   He continued to have significant hyperprolactinemia post surgery and was placed on dopamine agonist therapy.    He was seen previously by Dr. Caballero    He lives in [place] and he normally gets his labs done on the morning of his appointment and that he gets a call back about the test results        He has chronic bilateral hip pain which has affected his BP ( BP is high due to pain)  He doesn't see pain management      He is on cabergoline 1.25mg twice a week   ( 5 tablets a week  He reports good compliance   He denies headaches  He denies gynecomastia    Prolactin was 99 on 9/2021  Prolactin was 61 on 8/2022  Prolactin is 37 on 6/2023 ( 1mg 2 x a week)  Prolactin is 49 on 6/2024 (1.25mg 2 x a week)  Prolactin is 52 in 4/2025  (1.25mg 2 x a week)        His last MRI in 2023 showed an empty sella with persistent stalk deviation        He doesn't have GH deficiency  IGF-1 was 142 on 9/2021  IGF-1 is 142 on 6/2024          He was on TRT with intramuscular injections 100mg every 2 weeks but we switched  He is on  0.5ml or 100mg  every 7 days  He denies hot flashes and low libido  He denies LUTs  His total testosterone is 474  on 4/2025  HCT is 48%      He is on HC 15mg AM and 5mg PM and   He is aware of stress dosing  His CMP on 5/2025 is normal   Weight is steady      He is on Levothyroxine 137mcg daily   He denies constipation and cold intolerance  Last free T4 was 1.11 on 5/2025        With respect to his pituitary macroadenoma   He had an MRI on 5/1/2023 showed normal postsurgical changes with no evidence of recurrent adenoma        Patient's medications, allergies, and social histories were reviewed and updated as  appropriate.      ROS:     CONS:     No fever, no chills   EYES:     No diplopia, no blurry vision   CV:           No chest pain, no palpitations   PULM:     No SOB, no cough, no hemoptysis.   GI:            No nausea, no vomiting, no diarrhea, no constipation   ENDO:     No polyuria, no polydipsia, no heat intolerance, no cold intolerance       Past Medical History:  Problem List:  2022-08: Secondary adrenal insufficiency (HCC)  2022-08: Adult onset growth hormone deficiency (HCC)  2019-08: Migraine aura without headache  2019-07: Secondary male hypogonadism  2016-12: GERD with esophagitis  2009-11: Pituitary macroadenoma ([facility])  2009-11: Secondary hypothyroidism  2009-11: GERD (gastroesophageal reflux disease)  2009-11: Rectal lump  Hyperprolactinemia ([facility])      Past Surgical History:  History reviewed. No pertinent surgical history.     Allergies:  Patient has no known allergies.     Social History:  Social History     Tobacco Use    Smoking status: Never    Smokeless tobacco: Never   Substance Use Topics    Alcohol use: No    Drug use: No        Family History:   family history is not on file.      PHYSICAL EXAM:   Vital signs: BP (!) 148/78 (BP Location: Left arm, Patient Position: Sitting, BP Cuff Size: Adult long)   Pulse (!) 54   Ht 1.829 m (6')   Wt 114 kg (251 lb)   SpO2 97%   BMI 34.04 kg/m²   GENERAL: Well-developed, well-nourished in no apparent distress.   EYE:  No ocular asymmetry, PERRLA  HENT: Pink, moist mucous membranes.    NECK: No thyromegaly.   CHEST: no gynecomastia   CARDIOVASCULAR:  No murmurs  LUNGS: Clear breath sounds  ABDOMEN: Soft, nontender   EXTREMITIES: No clubbing, cyanosis, or edema.   NEUROLOGICAL: No gross focal motor abnormalities   LYMPH: No cervical adenopathy palpated.   SKIN: No rashes, lesions.       Labs:  Lab Results   Component Value Date/Time    SODIUM 139 04/28/2025 12:14 PM    POTASSIUM 4.2 04/28/2025 12:14 PM    CHLORIDE 104 04/28/2025 12:14 PM    CO2 23  2025 12:14 PM    ANION 12.0 2025 12:14 PM    GLUCOSE 95 2025 12:14 PM    BUN 12 2025 12:14 PM    CREATININE 1.05 2025 12:14 PM    CREATININE 0.9 2005 06:37 PM    CALCIUM 9.7 2025 12:14 PM    ASTSGOT 25 2025 12:14 PM    ALTSGPT 24 2025 12:14 PM    TBILIRUBIN 1.0 2025 12:14 PM    ALBUMIN 4.5 2025 12:14 PM    TOTPROTEIN 7.5 2025 12:14 PM    GLOBULIN 3.0 2025 12:14 PM    AGRATIO 1.5 2025 12:14 PM       Lab Results   Component Value Date/Time    SODIUM 138 2021 1058    POTASSIUM 4.1 2021 1058    CHLORIDE 104 2021 1058    CO2 22 2021 1058    GLUCOSE 81 2021 1058    BUN 15 2021 1058    CREATININE 0.76 2021 1058    CALCIUM 9.6 2021 1058    ANION 12.0 2021 1058       No results found for: CHOLSTRLTOT, TRIGLYCERIDE, HDL, LDL, CHOLHDLRAT, NONHDL    Lab Results   Component Value Date/Time    TSHULTRASEN 0.189 (L) 2020 1040     Lab Results   Component Value Date/Time    FREET4 1.24 2021 1005     No results found for: FREET3  No results found for: THYSTIMIG    No results found for: MICROSOMALA      Imagin2023 8:21 AM     HISTORY/REASON FOR EXAM:  follow up for pituitary tumor        TECHNIQUE/EXAM DESCRIPTION:  MRI of the brain and pituitary without and with contrast.     T2 axial images were obtained of the whole brain. Thin-section T2 fast spin-echo coronal, T1 coronal, T1 postcontrast coronal, T1 sagittal, and T1 postcontrast sagittal images were obtained of the pituitary gland and sella.     The study was performed on a [employer] 1.5 Ejnny MRI scanner.     20 mL ProHance contrast was administered intravenously.     COMPARISON:  2019     FINDINGS:  Stable post surgical changes transsphenoidal surgery with an expanded sella. The pituitary stalk is deviated to the left, unchanged. There is stable traction effect and ptotic appearance of the optic chiasm extending  prominently into the sella. There is   no progressive mass to suggest recurrent macroadenoma.     Remainder of the brain is stable in appearance. No mass effect, midline shift or hydrocephalus. No acute infarct on DWI. No intracranial hemorrhage or extra-axial fluid collection.     Proximal vascular flow voids are patent.     Orbital contents are symmetric.  There is mucosal thickening in the ethmoid sinuses, improved from prior.     IMPRESSION:     1.  Stable expanded postoperative appearance of the sella as detailed above. No progressive mass is seen to suggest recurrent adenoma.  2.  No acute or new brain abnormality.       ASSESSMENT/PLAN:     1. Hyperprolactinemia (HCC)  Improved   Continue Cabergoline 5 tablets a week   I believe that there is diminishing effect or benefit with increasing his cabergoline dose primarily because he has a deviated pituitary stalk and also there is a risk of side effects with higher doses of cabergoline.  Prevent  Reviewed risk of valvulopathy with high dose of cabergoline  Discussed the importance of compliance with therapy  Continue monitoring prolactin levels.      2. Secondary adrenal insufficiency (HCC)  Stable  Continue  HC 20mg daily in divided doses due to prevent weight gain and elevated blood pressure  Reviewed stress dosing of steroids      3. Secondary hypothyroidism  Free T4 levels are controlled  TSH levels are unreliable because of history of pituitary surgery  Continue levothyroxine 137 MCG daily   Repeat labs in 6-12 months       4. Secondary male hypogonadism  Stable   Testosterone levels are normal  Continue IM testosterone 100 mg every  weeks   Repeat labs in 6 months       5. Hypopituitarism (HCC)  See plan above      6. Vitamin D deficiency  Stable  Vitamin D labs were reviewed with the patient  Continue monitoring       Return in about 1 year (around 5/19/2026).      Total time spent on day of service was over 60 minutes which included obtaining a detailed  history and physical exam, ordering labs, coordinating care and scheduling future follow-up    Thank you kindly for allowing me to participate in the thyroid care plan for this patient.    Aren Foster MD, FACE, [facility]      CC:   Pcp Pt States None

## 2025-06-25 DIAGNOSIS — E27.49 SECONDARY ADRENAL INSUFFICIENCY (HCC): ICD-10-CM

## 2025-06-25 RX ORDER — HYDROCORTISONE 10 MG/1
TABLET ORAL
Qty: 300 TABLET | Refills: 3 | Status: SHIPPED | OUTPATIENT
Start: 2025-06-25